# Patient Record
Sex: FEMALE | Race: WHITE | Employment: FULL TIME | URBAN - METROPOLITAN AREA
[De-identification: names, ages, dates, MRNs, and addresses within clinical notes are randomized per-mention and may not be internally consistent; named-entity substitution may affect disease eponyms.]

---

## 2018-02-24 ENCOUNTER — HOSPITAL ENCOUNTER (OUTPATIENT)
Dept: MAMMOGRAPHY | Age: 63
Discharge: HOME | End: 2018-02-26
Payer: COMMERCIAL

## 2018-02-24 DIAGNOSIS — Z12.31 VISIT FOR SCREENING MAMMOGRAM: ICD-10-CM

## 2018-02-24 PROCEDURE — 77063 BREAST TOMOSYNTHESIS BI: CPT

## 2019-03-16 ENCOUNTER — HOSPITAL ENCOUNTER (OUTPATIENT)
Dept: MAMMOGRAPHY | Age: 64
Discharge: HOME | End: 2019-03-18
Payer: COMMERCIAL

## 2019-03-16 DIAGNOSIS — Z12.39 BREAST CANCER SCREENING: ICD-10-CM

## 2019-03-16 PROCEDURE — 77063 BREAST TOMOSYNTHESIS BI: CPT

## 2020-11-12 ENCOUNTER — OFFICE VISIT (OUTPATIENT)
Dept: FAMILY MEDICINE CLINIC | Age: 65
End: 2020-11-12
Payer: COMMERCIAL

## 2020-11-12 ENCOUNTER — HOSPITAL ENCOUNTER (OUTPATIENT)
Age: 65
Setting detail: SPECIMEN
Discharge: HOME | End: 2020-11-12
Payer: COMMERCIAL

## 2020-11-12 VITALS
HEIGHT: 62 IN | BODY MASS INDEX: 27.42 KG/M2 | TEMPERATURE: 97.2 F | OXYGEN SATURATION: 98 % | HEART RATE: 77 BPM | WEIGHT: 149 LBS

## 2020-11-12 PROCEDURE — 99202 OFFICE O/P NEW SF 15 MIN: CPT | Performed by: NURSE PRACTITIONER

## 2020-11-12 RX ORDER — LOSARTAN POTASSIUM 50 MG/1
TABLET ORAL
COMMUNITY

## 2020-11-12 RX ORDER — RISEDRONATE SODIUM 35 MG/1
TABLET, FILM COATED ORAL
COMMUNITY
Start: 2020-10-14 | End: 2022-02-22 | Stop reason: SDUPTHER

## 2020-11-12 RX ORDER — HYDROCHLOROTHIAZIDE 12.5 MG/1
TABLET ORAL
COMMUNITY
Start: 2020-10-14

## 2020-11-12 RX ORDER — SIMVASTATIN 20 MG
TABLET ORAL
COMMUNITY
Start: 2020-10-14

## 2020-11-12 ASSESSMENT — PATIENT HEALTH QUESTIONNAIRE - PHQ9
2. FEELING DOWN, DEPRESSED OR HOPELESS: 0
SUM OF ALL RESPONSES TO PHQ9 QUESTIONS 1 & 2: 0
SUM OF ALL RESPONSES TO PHQ QUESTIONS 1-9: 0
1. LITTLE INTEREST OR PLEASURE IN DOING THINGS: 0
SUM OF ALL RESPONSES TO PHQ QUESTIONS 1-9: 0
SUM OF ALL RESPONSES TO PHQ QUESTIONS 1-9: 0

## 2020-11-12 ASSESSMENT — ENCOUNTER SYMPTOMS
COUGH: 0
SORE THROAT: 1
EYE DISCHARGE: 0
CHEST TIGHTNESS: 0
VOICE CHANGE: 0
SINUS PRESSURE: 0
EYE REDNESS: 0
WHEEZING: 0
SHORTNESS OF BREATH: 0

## 2020-11-12 NOTE — PROGRESS NOTES
[address] [facility]  [address] [place] [address]  Dept: [phone]  Dept Fax: [phone]     Marion Powell is a 63 y.o. female who presents to the urgent care today for her medicalconditions/complaints as noted below. Marion Cancer is c/o of Pharyngitis; Headache; and Generalized Body Aches    HPI:      Pharyngitis   This is a new problem. Episode onset: Friday. The problem has been unchanged. Associated symptoms include congestion, a fever (99), headaches and a sore throat. Pertinent negatives include no chest pain, chills, coughing, fatigue, myalgias, rash or weakness. The symptoms are aggravated by drinking, eating and swallowing. She has tried nothing for the symptoms. The treatment provided no relief. Past Medical History:   Diagnosis Date    Elevated cholesterol     Headache(784.0)     HTN (hypertension)            Current Outpatient Medications   Medication Sig Dispense Refill    losartan (COZAAR) 50 MG tablet losartan 50 mg tablet      hydroCHLOROthiazide (HYDRODIURIL) 12.5 MG tablet       risedronate (ACTONEL) 35 MG tablet       simvastatin (ZOCOR) 20 MG tablet       losartan-hydrochlorothiazide (HYZAAR) 50-12.5 MG per tablet Take 1 tablet by mouth daily.  fish oil-omega-3 fatty acids 1000 MG capsule Take 2 g by mouth daily.  Ascorbic Acid (VITAMIN C) 500 MG tablet Take 500 mg by mouth daily.  Glucosamine-Chondroitin 500-400 MG CAPS Take 2 each by mouth 2 times daily.  Omeprazole Magnesium (PRILOSEC OTC PO) Take  by mouth.  aspirin 81 MG chewable tablet Take 81 mg by mouth daily.  therapeutic multivitamin-minerals (THERAGRAN-M) tablet Take 1 tablet by mouth daily. No current facility-administered medications for this visit. No Known Allergies    Subjective:      Review of Systems   Constitutional: Positive for fever (99). Negative for chills and fatigue.    HENT:

## 2020-11-17 LAB — SARS-COV-2, NAA: NOT DETECTED

## 2021-07-14 ENCOUNTER — OFFICE VISIT (OUTPATIENT)
Dept: ORTHOPEDIC SURGERY | Age: 66
End: 2021-07-14
Payer: COMMERCIAL

## 2021-07-14 DIAGNOSIS — M25.562 ACUTE PAIN OF LEFT KNEE: Primary | ICD-10-CM

## 2021-07-14 PROCEDURE — 99203 OFFICE O/P NEW LOW 30 MIN: CPT | Performed by: ORTHOPAEDIC SURGERY

## 2021-07-14 RX ORDER — MELOXICAM 15 MG/1
15 TABLET ORAL DAILY
Qty: 30 TABLET | Refills: 3 | Status: SHIPPED | OUTPATIENT
Start: 2021-07-14 | End: 2021-11-11

## 2021-07-14 NOTE — PROGRESS NOTES
Negrito Zapata M.D.            [facility] [address] [facility],[address], [address] [facility]           Dept Phone: [phone]           Dept Fax:  [address]           Ferzo Harris          Dept Phone: [phone]           Dept Fax:  [phone]      Chief Compliant:  Chief Complaint   Patient presents with    Pain     Lt knee        History of Present Illness: This is a 61 y.o. female who presents to the clinic today for evaluation / follow up of right knee pain. Patient is a very active 72-year-old female seen for evaluation for medial left knee pain. She does not recall any specific injury. She points to the area and actually below her knee in the pes area where she has most of her tenderness but she also has a little bit in her knee as well. No prior surgeries no prior injections she is not on anti-inflammatories. Most of her pain is depends on her activity. .       Review of Systems   Constitutional: Negative for fever, chills, sweats. Eyes: Negative for changes in vision, or pain. HENT: Negative for ear ache, epistaxis, or sore throat. Respiratory/Cardio: Negative for Chest pain, palpitations, SOB, or cough. Gastrointestinal: Negative for abdominal pain, N/V/D. Genitourinary: Negative for dysuria, frequency, urgency, or hematuria. Neurological: Negative for headache, numbness, or weakness. Integumentary: Negative for rash, itching, laceration, or abrasion. Musculoskeletal: Positive for Pain (Lt knee)       Physical Exam:  Constitutional: Patient is oriented to person, place, and time. Patient appears well-developed and well nourished. HENT: Negative otherwise noted  Head: Normocephalic and Atraumatic  Nose: Normal  Eyes: Conjunctivae and EOM are normal  Neck: Normal range of motion Neck supple.     Respiratory/Cardio: Effort normal. No respiratory distress. Musculoskeletal: Examination of the patient's left knee notes no obvious effusion. She does have a some slight swelling over the pes area. Her knee motion is good however 0 to 130 degrees. Aria's is very equivocal.  No crepitus or grinding. No effusion Lachman's normal collaterals normal no hip rotational pain no IT band findings no calf tenderness negative Homans. Neurological: Patient is alert and oriented to person, place, and time. Normal strenght. No sensory deficit. Skin: Skin is warm and dry  Psychiatric: Behavior is normal. Thought content normal.  Nursing note and vitals reviewed. Labs and Imaging:   Patient had x-rays available for review from the [facility] clinic pushed into the [facility] which show standing AP and lateral of the left knee. Patient has some moderate medial joint space narrowing of her left knee with some early spur formation as well. Laterally she is okay. On the lateral view itself she has some modest patellofemoral narrowing but certainly nothing too severe     No orders of the defined types were placed in this encounter. Assessment and Plan: Moderate degenerative joint disease left knee  Pes bursitis left knee        This is a 61 y.o. female who presents to the clinic today for evaluation / follow up of DJD left knee. Past History:    Current Outpatient Medications:     losartan (COZAAR) 50 MG tablet, losartan 50 mg tablet, Disp: , Rfl:     hydroCHLOROthiazide (HYDRODIURIL) 12.5 MG tablet, , Disp: , Rfl:     risedronate (ACTONEL) 35 MG tablet, , Disp: , Rfl:     simvastatin (ZOCOR) 20 MG tablet, , Disp: , Rfl:     losartan-hydrochlorothiazide (HYZAAR) 50-12.5 MG per tablet, Take 1 tablet by mouth daily. , Disp: , Rfl:     fish oil-omega-3 fatty acids 1000 MG capsule, Take 2 g by mouth daily. , Disp: , Rfl:     Ascorbic Acid (VITAMIN C) 500 MG tablet, Take 500 mg by mouth daily.   , Disp: , Rfl:     Glucosamine-Chondroitin 500-400 MG CAPS, Take 2 each by mouth 2 times daily. , Disp: , Rfl:     Omeprazole Magnesium (PRILOSEC OTC PO), Take  by mouth.  , Disp: , Rfl:     aspirin 81 MG chewable tablet, Take 81 mg by mouth daily. , Disp: , Rfl:     therapeutic multivitamin-minerals (THERAGRAN-M) tablet, Take 1 tablet by mouth daily. , Disp: , Rfl:   No Known Allergies  Social History     Socioeconomic History    Marital status:      Spouse name: Not on file    Number of children: Not on file    Years of education: Not on file    Highest education level: Not on file   Occupational History    Not on file   Tobacco Use    Smoking status: Never Smoker    Smokeless tobacco: Never Used   Substance and Sexual Activity    Alcohol use: Yes     Comment: social    Drug use: No    Sexual activity: Yes     Partners: Male   Other Topics Concern    Not on file   Social History Narrative    Not on file     Social Determinants of Health     Financial Resource Strain:     Difficulty of Paying Living Expenses:    Food Insecurity:     Worried About Running Out of Food in the Last Year:     [address] in the Last Year:    Transportation Needs:     Lack of Transportation (Medical):      Lack of Transportation (Non-Medical):    Physical Activity:     Days of Exercise per Week:     Minutes of Exercise per Session:    Stress:     Feeling of Stress :    Social Connections:     Frequency of Communication with Friends and Family:     Frequency of Social Gatherings with Friends and Family:     Attends Moravian Services:     Active Member of Clubs or Organizations:     Attends Club or Organization Meetings:     Marital Status:    Intimate Partner Violence:     Fear of Current or Ex-Partner:     Emotionally Abused:     Physically Abused:     Sexually Abused:      Past Medical History:   Diagnosis Date    Elevated cholesterol     Headache(784.0)     HTN (hypertension)      Past Surgical History:   Procedure Laterality Date     SECTION      COLONOSCOPY      with endoscopy    TONSILLECTOMY AND ADENOIDECTOMY      UPPER GASTROINTESTINAL ENDOSCOPY       Family History   Problem Relation Age of Onset    Heart Disease Father         chf    Diabetes Mother     Hypertension Mother     Heart Disease Mother         chf    Heart Disease Maternal Grandmother     Heart Attack Maternal Grandmother     Heart Disease Maternal Grandfather    Plan  I informed the patient of the above findings. I do not feel that she has a meniscus tear at this time she does have moderate arthritis and I think she would benefit from be started on an anti-inflammatory. She will be started on Mobic 15 mg p.o. daily and this be called in for her. Patient will also was advised about topical Voltaren gel to be applied to the bursal area as well as some stretches. We will see how she does with this. She will contact us in a month if there is no resolution otherwise anticipate doing well    Provider Attestation:  Ary Angelo, personally performed the services described in this documentation. All medical record entries made by the scribe were at my direction and in my presence. I have reviewed the chart and discharge instructions and agree that the records reflect my personal performance and is accurate and complete. Octaviano Noriega MD. 21      Please note that this chart was generated using voice recognition Dragon dictation software. Although every effort was made to ensure the accuracy of this automated transcription, some errors in transcription may have occurred.

## 2021-08-04 ENCOUNTER — TELEPHONE (OUTPATIENT)
Dept: ORTHOPEDIC SURGERY | Age: 66
End: 2021-08-04

## 2021-08-04 NOTE — TELEPHONE ENCOUNTER
Patient called in requesting some advice for her knee. Patient would like any suggestions or possible exercises she could do for her knee. Please advise and address thank you!

## 2021-08-05 ENCOUNTER — OFFICE VISIT (OUTPATIENT)
Dept: ORTHOPEDIC SURGERY | Age: 66
End: 2021-08-05
Payer: COMMERCIAL

## 2021-08-05 DIAGNOSIS — M25.562 ACUTE PAIN OF LEFT KNEE: Primary | ICD-10-CM

## 2021-08-05 DIAGNOSIS — M70.50 PES ANSERINE BURSITIS: ICD-10-CM

## 2021-08-05 PROCEDURE — 20610 DRAIN/INJ JOINT/BURSA W/O US: CPT | Performed by: PHYSICIAN ASSISTANT

## 2021-08-05 PROCEDURE — 99214 OFFICE O/P EST MOD 30 MIN: CPT | Performed by: PHYSICIAN ASSISTANT

## 2021-08-05 RX ORDER — BUPIVACAINE HYDROCHLORIDE 5 MG/ML
1 INJECTION, SOLUTION PERINEURAL ONCE
Status: COMPLETED | OUTPATIENT
Start: 2021-08-05 | End: 2021-08-05

## 2021-08-05 RX ORDER — LIDOCAINE HYDROCHLORIDE 10 MG/ML
1 INJECTION, SOLUTION INFILTRATION; PERINEURAL ONCE
Status: COMPLETED | OUTPATIENT
Start: 2021-08-05 | End: 2021-08-05

## 2021-08-05 RX ORDER — BETAMETHASONE SODIUM PHOSPHATE AND BETAMETHASONE ACETATE 3; 3 MG/ML; MG/ML
6 INJECTION, SUSPENSION INTRA-ARTICULAR; INTRALESIONAL; INTRAMUSCULAR; SOFT TISSUE ONCE
Status: COMPLETED | OUTPATIENT
Start: 2021-08-05 | End: 2021-08-05

## 2021-08-05 RX ADMIN — BETAMETHASONE SODIUM PHOSPHATE AND BETAMETHASONE ACETATE 6 MG: 3; 3 INJECTION, SUSPENSION INTRA-ARTICULAR; INTRALESIONAL; INTRAMUSCULAR; SOFT TISSUE at 16:20

## 2021-08-05 RX ADMIN — LIDOCAINE HYDROCHLORIDE 1 ML: 10 INJECTION, SOLUTION INFILTRATION; PERINEURAL at 16:23

## 2021-08-05 RX ADMIN — BUPIVACAINE HYDROCHLORIDE 5 MG: 5 INJECTION, SOLUTION PERINEURAL at 16:23

## 2021-08-05 NOTE — PROGRESS NOTES
[facility], [address] [place], [address] [place] [address], [id number] [place] [address]           Dept Phone: [phone]           Dept Fax:  [phone] [address] [facility]           Feroz Harris          Dept Phone: [phone]           Dept Fax:  [phone]      Chief Compliant:  Chief Complaint   Patient presents with    Pain     left knee         History of Present Illness: This is a 61 y.o. female who presents to the clinic today for evaluation of had concerns including Pain (left knee ). Mrs. Sarai Cardona is a pleasant 24-year-old female presents for reevaluation of left knee pain. She initially saw Dr. Gerry Becker on 7/14/2021. At that time patient was found to have some mild osteoarthritis however the majority of her pain was over the pes bursa. She was started on Mobic for the arthritis and recommended to obtain over-the-counter Voltaren gel for the pes bursitis. Patient returns today noting minimal improvement of pain. She states that her pain actually has been worse for the last 1 week without any new injury or trauma. Patient reports she is very active does a lot of walking and bike riding she also is an avid golfer. She states over the last week her pain has gotten worse in severity but is in the same spot as it was 3 weeks ago. Pain continues to be most severe to the anterior medial knee below the medial joint line itself. Associated with focal swelling in this area. Of note patient denies any injury, trauma or fall prior to the onset of initial pain 3 weeks ago.        Past History:    Current Outpatient Medications:     meloxicam (MOBIC) 15 MG tablet, Take 1 tablet by mouth daily, Disp: 30 tablet, Rfl: 3    losartan (COZAAR) 50 MG tablet, losartan 50 mg tablet, Disp: , Rfl:     hydroCHLOROthiazide (HYDRODIURIL) 12.5 MG tablet, , Disp: , Rfl:    risedronate (ACTONEL) 35 MG tablet, , Disp: , Rfl:     simvastatin (ZOCOR) 20 MG tablet, , Disp: , Rfl:     losartan-hydrochlorothiazide (HYZAAR) 50-12.5 MG per tablet, Take 1 tablet by mouth daily. , Disp: , Rfl:     fish oil-omega-3 fatty acids 1000 MG capsule, Take 2 g by mouth daily. , Disp: , Rfl:     Ascorbic Acid (VITAMIN C) 500 MG tablet, Take 500 mg by mouth daily. , Disp: , Rfl:     Glucosamine-Chondroitin 500-400 MG CAPS, Take 2 each by mouth 2 times daily. , Disp: , Rfl:     Omeprazole Magnesium (PRILOSEC OTC PO), Take  by mouth.  , Disp: , Rfl:     aspirin 81 MG chewable tablet, Take 81 mg by mouth daily. , Disp: , Rfl:     therapeutic multivitamin-minerals (THERAGRAN-M) tablet, Take 1 tablet by mouth daily. , Disp: , Rfl:   No Known Allergies  Social History     Socioeconomic History    Marital status:      Spouse name: Not on file    Number of children: Not on file    Years of education: Not on file    Highest education level: Not on file   Occupational History    Not on file   Tobacco Use    Smoking status: Never Smoker    Smokeless tobacco: Never Used   Substance and Sexual Activity    Alcohol use: Yes     Comment: social    Drug use: No    Sexual activity: Yes     Partners: Male   Other Topics Concern    Not on file   Social History Narrative    Not on file     Social Determinants of Health     Financial Resource Strain:     Difficulty of Paying Living Expenses:    Food Insecurity:     Worried About Running Out of Food in the Last Year:     [address] in the Last Year:    Transportation Needs:     Lack of Transportation (Medical):      Lack of Transportation (Non-Medical):    Physical Activity:     Days of Exercise per Week:     Minutes of Exercise per Session:    Stress:     Feeling of Stress :    Social Connections:     Frequency of Communication with Friends and Family:     Frequency of Social Gatherings with Friends and Family:     Attends Yarsani Services:     Active Member of Clubs or Organizations:     Attends Club or Organization Meetings:     Marital Status:    Intimate Partner Violence:     Fear of Current or Ex-Partner:     Emotionally Abused:     Physically Abused:     Sexually Abused:      Past Medical History:   Diagnosis Date    Elevated cholesterol     Headache(784.0)     HTN (hypertension)      Past Surgical History:   Procedure Laterality Date     SECTION      COLONOSCOPY      with endoscopy    TONSILLECTOMY AND ADENOIDECTOMY      UPPER GASTROINTESTINAL ENDOSCOPY       Family History   Problem Relation Age of Onset    Heart Disease Father         chf    Diabetes Mother     Hypertension Mother     Heart Disease Mother         chf    Heart Disease Maternal Grandmother     Heart Attack Maternal Grandmother     Heart Disease Maternal Grandfather         Review of Systems   Constitutional: Negative for fever, chills, sweats. Eyes: Negative for changes in vision, or pain. HENT: Negative for ear ache, epistaxis, or sore throat. Respiratory/Cardio: Negative for Chest pain, palpitations, SOB, or cough. Gastrointestinal: Negative for abdominal pain, N/V/D. Genitourinary: Negative for dysuria, frequency, urgency, or hematuria. Neurological: Negative for headache, numbness, or weakness. Integumentary: Negative for rash, itching, laceration, or abrasion. Musculoskeletal: Positive for Pain (left knee )       Physical Exam:  Constitutional: Patient is oriented to person, place, and time. Patient appears well-developed and well nourished. HENT: Negative otherwise noted  Head: Normocephalic and Atraumatic  Nose: Normal  Eyes: Conjunctivae and EOM are normal  Neck: Normal range of motion Neck supple. Respiratory/Cardio: Effort normal. No respiratory distress.   Musculoskeletal:    Left Knee:     Skin: warm and dry, no rash or erythema  Vasculature: 2+ pedal pulses bilaterally  Neuro: Sensation grossly intact to light touch diffusely  Alignment: Normal  Tenderness: Moderate tenderness to the medial hamstring tendon and the pes bursal area. Mild medial joint line no lateral joint line tenderness medially or laterally. No tenderness to quad/patellar tendon or posterior knee. Effusion: none  Swelling: Focal swelling over the pes bursal area    ROM: (Degrees)       A P       Extension  0 0       Flexion   120 125       Crepitation  No       Muscle strength:         Flexion   5      Extension  5      SLR   5        Extensor lag   n          Special testing:  y    Pain with deep knee flexion     n    Patellar grind       y    Patellar apprehension      n    Patellar glide         n    Lachman       n    Anterior drawer      n    Pivot shift       n    Posterior drawer      n    Dial test       n    Posterolateral drawer      n    Posterior Sag       n    MCL        n    LCL          Mild    Medial joint line tenderness     n    Lateral joint line tenderness     y    McMurrey's         Neurological: Patient is alert and oriented to person, place, and time. Normal strenght. No sensory deficit. Skin: Skin is warm and dry  Psychiatric: Behavior is normal. Thought content normal.  Nursing note and vitals reviewed. Labs and Imaging:         No New x-rays are taken today however [facility] are again available for review. Moderate medial and patellofemoral joint space narrowing with early spur formation. No evidence of acute fracture. No orders of the defined types were placed in this encounter. Assessment and Plan:  1. Acute pain of left knee    2. Pes anserine bursitis          PLAN:  Jim Chan is a 61 y.o. old female who presents to the clinic today for evaluation of continued left knee pain consistent with pes anserine bursitis.   Dr. Ezra Nguyen initially started patient on Mobic and Voltaren gel unfortunately patient notes mild minimal relief of pain over the last week she does note that her pain has been slightly worse. She has focal swelling and significant tenderness to the pes anserine bursal area. She has some mild medial joint line tenderness concerning for possible medial meniscus tear however she does have some moderate arthritis in this area as well. No evidence of ligamentous laxity. 1.  Patient has failed over-the-counter and prescription strength NSAIDs at this time. 2.  Discussed treatment options available to her at this time patient elected to proceed with a Pes anserine bursal Celestone injection which is given as outlined below. Educated on home exercises to perform specifically focused on stretching and range of motion. 3.  Also considered is a medial meniscus tear as patient does have quite a bit of medial joint line tenderness with underlying osteoarthritis in that area. Should she continue to be significant painful despite this injection at next follow-up we will discuss possibility of an MRI of the left knee. 4.  Follow-up in 3 to 4 weeks however patient may call return sooner for any questions or concerns    Procedure Note: Left Pes Bursal Celestone Injection   An informed verbal consent for the procedure was obtained and risks including, but not limited to: allergy to medications, injection, bleeding, stiffness of joint, recurrence of symptoms, loss of function, swelling, drainage, irrigation, need for surgery and pseudo-septic inflammation, were explained to the patient. Also, discussed was the potential for further injections, irrigation and debridement and surgery. Alternate means of treatment have also been discussed with the patient. Following an appropriate discussion with the patient regarding the risks and benefits of the procedure she consented to proceed. her left Pes bursal area was prepped using betadine solution and alcohol swab.  Using aseptic technique and through a over the area of most severe tenderness and swelling 3 cc mixture of 1 cc of 6 lidocaine, 1 cc of Marcaine and 1 cc of 6 mg/mL Celestone injection in the area of most severe tenderness over the pes anserine bursa. A band aid was applied to the injection site. she tolerated the injection with no immediate adverse reactions. Electronically signed by Diamante Canchola on 8/5/21 at 2:07 PM EDT        Please note that this chart was generated using voice recognition Dragon dictation software. Although every effort was made to ensure the accuracy of this automated transcription, some errors in transcription may have occurred.

## 2021-08-06 NOTE — TELEPHONE ENCOUNTER
Pt saw Arie Howell on 8/5/21. LVM to return a call if she had any additional questions that were not answered during  her appointment.

## 2021-08-23 ENCOUNTER — TELEPHONE (OUTPATIENT)
Dept: OBGYN CLINIC | Age: 66
End: 2021-08-23

## 2021-08-23 NOTE — TELEPHONE ENCOUNTER
Pt called she is a new patient she has an appt in October she has a pessary and she took it out because she started experiencing some spotting and she is still having the spotting she is wondering if she should be seen sooner or wait it out

## 2021-08-23 NOTE — TELEPHONE ENCOUNTER
Is there a 7:30 on a Tuesday or Friday where you can add her sooner? If so she can be in one of those spots.   Thanks

## 2021-09-02 ENCOUNTER — OFFICE VISIT (OUTPATIENT)
Dept: ORTHOPEDIC SURGERY | Age: 66
End: 2021-09-02
Payer: COMMERCIAL

## 2021-09-02 VITALS — HEIGHT: 62 IN | BODY MASS INDEX: 27.42 KG/M2 | WEIGHT: 149 LBS | RESPIRATION RATE: 12 BRPM

## 2021-09-02 DIAGNOSIS — M70.50 PES ANSERINE BURSITIS: Primary | ICD-10-CM

## 2021-09-02 DIAGNOSIS — M17.12 PRIMARY OSTEOARTHRITIS OF LEFT KNEE: ICD-10-CM

## 2021-09-02 PROCEDURE — 99213 OFFICE O/P EST LOW 20 MIN: CPT | Performed by: PHYSICIAN ASSISTANT

## 2021-09-08 NOTE — PROGRESS NOTES
 Elevated cholesterol     Headache(784.0)     HTN (hypertension)      Past Surgical History:   Procedure Laterality Date     SECTION      COLONOSCOPY      with endoscopy    TONSILLECTOMY AND ADENOIDECTOMY      UPPER GASTROINTESTINAL ENDOSCOPY       Family History   Problem Relation Age of Onset    Heart Disease Father         chf    Diabetes Mother     Hypertension Mother     Heart Disease Mother         chf    Heart Disease Maternal Grandmother     Heart Attack Maternal Grandmother     Heart Disease Maternal Grandfather         Review of Systems   Constitutional: Negative for fever, chills, sweats. Eyes: Negative for changes in vision, or pain. HENT: Negative for ear ache, epistaxis, or sore throat. Respiratory/Cardio: Negative for Chest pain, palpitations, SOB, or cough. Gastrointestinal: Negative for abdominal pain, N/V/D. Genitourinary: Negative for dysuria, frequency, urgency, or hematuria. Neurological: Negative for headache, numbness, or weakness. Integumentary: Negative for rash, itching, laceration, or abrasion. Musculoskeletal: Positive for Knee Pain (left, pes bursa injection- 21)       Physical Exam:  Constitutional: Patient is oriented to person, place, and time. Patient appears well-developed and well nourished. HENT: Negative otherwise noted  Head: Normocephalic and Atraumatic  Nose: Normal  Eyes: Conjunctivae and EOM are normal  Neck: Normal range of motion Neck supple. Respiratory/Cardio: Effort normal. No respiratory distress. Musculoskeletal:    left Knee:     Skin: warm and dry, no rash or erythema  Vasculature: 2+ pedal pulses bilaterally  Neuro: Sensation grossly intact to light touch diffusely  Alignment: Normal  Tenderness: Mild tenderness the medial joint line. Mild tenderness to the pes anserine bursa. No tenderness to lateral joint line. No tenderness to quad/patellar tendon, or posterior knee.   Effusion: small    ROM: (Degrees)       A P       Extension  0 0       Flexion   115 120       Crepitation  Yes       Muscle strength:         Flexion   5      Extension  5      SLR   5        Extensor lag   y          Special testing:  y    Pain with deep knee flexion     y    Patellar grind       n    Patellar apprehension      n    Patellar glide         n    Lachman       n    Anterior drawer      n    Pivot shift       n    Posterior drawer      n    Dial test       n    Posterolateral drawer      n    Posterior Sag       n    MCL        n    LCL          mild    Medial joint line tenderness     n    Lateral joint line tenderness     y    Appley's         Neurological: Patient is alert and oriented to person, place, and time. Normal strenght. No sensory deficit. Skin: Skin is warm and dry  Psychiatric: Behavior is normal. Thought content normal.  Nursing note and vitals reviewed. Labs and Imaging:        No orders of the defined types were placed in this encounter. Assessment and Plan:  1. Pes anserine bursitis    2. Primary osteoarthritis of left knee          PLAN:  Corina Burgos is a 61 y.o. old female who presents for follow up left knee pain. Patient with history of some mild osteoarthritis within this left knee she was found to have pes anserine bursitis when she was seen on 8/5/2021 she underwent a Pez anserine bursal injection at that time. Also considered is possible medial meniscus tear given her pain   1. Patient reports that this injection did provide significant relief of pain. She does occasionally have pain if she is overactive or kneels directly on her neck but does feel better than she did 1 month ago. 2.  On examination patient continues to be tender over the pes anserine bursa and the medial joint line.   She is educated that I do question possible medial meniscus tear and should this pain continue she may benefit from a possible MRI however patient reports she is doing much better and will continue to proceed with conservative management at this time. 3.  We will see patient back on a as needed basis however she may call return for any questions or concerns        Electronically signed by ANTONIETTA Guzman on 9/8/21 at 8:26 AM EDT        Please note that this chart was generated using voice recognition Dragon dictation software. Although every effort was made to ensure the accuracy of this automated transcription, some errors in transcription may have occurred.

## 2021-12-22 ENCOUNTER — OFFICE VISIT (OUTPATIENT)
Dept: OBGYN CLINIC | Age: 66
End: 2021-12-22
Payer: COMMERCIAL

## 2021-12-22 VITALS
HEIGHT: 62 IN | DIASTOLIC BLOOD PRESSURE: 88 MMHG | WEIGHT: 154.2 LBS | SYSTOLIC BLOOD PRESSURE: 129 MMHG | BODY MASS INDEX: 28.37 KG/M2 | HEART RATE: 76 BPM

## 2021-12-22 DIAGNOSIS — N39.3 STRESS INCONTINENCE: ICD-10-CM

## 2021-12-22 DIAGNOSIS — Z12.31 SCREENING MAMMOGRAM FOR BREAST CANCER: ICD-10-CM

## 2021-12-22 DIAGNOSIS — Z01.419 WOMEN'S ANNUAL ROUTINE GYNECOLOGICAL EXAMINATION: Primary | ICD-10-CM

## 2021-12-22 DIAGNOSIS — N81.10 PROLAPSE OF ANTERIOR VAGINAL WALL: ICD-10-CM

## 2021-12-22 PROCEDURE — 99386 PREV VISIT NEW AGE 40-64: CPT | Performed by: OBSTETRICS & GYNECOLOGY

## 2021-12-22 RX ORDER — OXYQUINOLINE/BORIC ACID 0.025 %
1 JELLY WITH APPLICATOR (GRAM) VAGINAL PRN
Qty: 113.4 G | Refills: 0 | Status: SHIPPED | OUTPATIENT
Start: 2021-12-22

## 2021-12-22 ASSESSMENT — ENCOUNTER SYMPTOMS
SHORTNESS OF BREATH: 0
COUGH: 0
BACK PAIN: 0
ABDOMINAL PAIN: 0

## 2021-12-22 NOTE — PROGRESS NOTES
[facility] PHYSICIANS  [facility] - [address] [facility] [address] [facility]  Dept: [phone]    Chief complaint:   Chief Complaint   Patient presents with    Annual Exam       History Present Illness: Denisa Vela is a 62 yo female who presents for her annual exam, and to establish care. She did receive her COVID vaccine. She did try a pessary for awhile and only uses it occasionally now because of discharge and some slight irritation. She is having incontinence with laugh/cough/sneeze sometimes and some urgency as well. She denies any discharge when she isn't using the pessary. She is sexually active with her  and denies any dyspareunia and says her prolapse doesn't interfere. She says that sometimes she does have a bulge outside the vagina. She isn't sure about the pessary. She denies any bowel or bladder issues. Current Medications (OTC/Herbal):   Current Outpatient Medications   Medication Sig Dispense Refill    meloxicam (MOBIC) 15 MG tablet TAKE ONE TABLET BY MOUTH DAILY 90 tablet 3    losartan (COZAAR) 50 MG tablet losartan 50 mg tablet      hydroCHLOROthiazide (HYDRODIURIL) 12.5 MG tablet       risedronate (ACTONEL) 35 MG tablet       simvastatin (ZOCOR) 20 MG tablet       losartan-hydrochlorothiazide (HYZAAR) 50-12.5 MG per tablet Take 1 tablet by mouth daily.  Glucosamine-Chondroitin 500-400 MG CAPS Take 2 each by mouth 2 times daily.  Omeprazole Magnesium (PRILOSEC OTC PO) Take  by mouth.  therapeutic multivitamin-minerals (THERAGRAN-M) tablet Take 1 tablet by mouth daily.  fish oil-omega-3 fatty acids 1000 MG capsule Take 2 g by mouth daily. (Patient not taking: Reported on 12/22/2021)      Ascorbic Acid (VITAMIN C) 500 MG tablet Take 500 mg by mouth daily. (Patient not taking: Reported on 12/22/2021)      aspirin 81 MG chewable tablet Take 81 mg by mouth daily.    (Patient not taking: Reported on 12/22/2021)       No current facility-administered medications for this visit. Allergies: No Known Allergies  Past Medical History:   Past Medical History:   Diagnosis Date    Elevated cholesterol     Headache(784.0)     HTN (hypertension)      Past Surgical History:   Past Surgical History:   Procedure Laterality Date     SECTION      COLONOSCOPY      with endoscopy    TONSILLECTOMY AND ADENOIDECTOMY      UPPER GASTROINTESTINAL ENDOSCOPY       Obstetric History:   3  Para 1  Gynecologic History: LMP postmenopausal   Menarche 14    Last Pap: 20       Any history of abnormal paps no    PriorColpo/Biopsy n/a     Last Mammogram 3/16/19  Result normal  Contraception: postmenopausal  Complications: none  STDs: none  Psychosocial History: Occupation:   teacher   Caffeine No    At risk for depression No    Abuse:   No  Seatbelt:   Yes  Exercise:  Yes    Social History     Socioeconomic History    Marital status:      Spouse name: Not on file    Number of children: Not on file    Years of education: Not on file    Highest education level: Not on file   Occupational History    Not on file   Tobacco Use    Smoking status: Never Smoker    Smokeless tobacco: Never Used   Vaping Use    Vaping Use: Never used   Substance and Sexual Activity    Alcohol use: Yes     Comment: social    Drug use: No    Sexual activity: Yes     Partners: Male   Other Topics Concern    Not on file   Social History Narrative    Not on file     Social Determinants of Health     Financial Resource Strain:     Difficulty of Paying Living Expenses: Not on file   Food Insecurity:     Worried About Running Out of Food in the Last Year: Not on file    Lali of Food in the Last Year: Not on file   Transportation Needs:     Lack of Transportation (Medical): Not on file    Lack of Transportation (Non-Medical):  Not on file   Physical Activity:     Days of Exercise per Week: Not on file    Minutes of Exercise per Session: Not on file   Stress:     Feeling of Stress : Not on file   Social Connections:     Frequency of Communication with Friends and Family: Not on file    Frequency of Social Gatherings with Friends and Family: Not on file    Attends [employer] Services: Not on file    Active Member of [address] [place] [address] [employer] or Organizations: Not on file    Attends Club or Organization Meetings: Not on file    Marital Status: Not on file   Intimate Partner Violence:     Fear of Current or Ex-Partner: Not on file    Emotionally Abused: Not on file    Physically Abused: Not on file    Sexually Abused: Not on file   Housing Stability:     Unable to Pay for Housing in the Last Year: Not on file    Number of [place] in the Last Year: Not on file    Unstable Housing in the Last Year: Not on file       Family History   Problem Relation Age of Onset    Heart Disease Father         chf    Diabetes Mother     Hypertension Mother     Heart Disease Mother         chf    Heart Disease Maternal Grandmother     Heart Attack Maternal Grandmother     Heart Disease Maternal Grandfather        Review of Systems:   Review of Systems   Constitutional: Negative for chills and fever. HENT: Negative for congestion. Respiratory: Negative for cough and shortness of breath. Cardiovascular: Negative for chest pain and palpitations. Gastrointestinal: Negative for abdominal pain. Genitourinary: Negative for dyspareunia, pelvic pain and vaginal discharge. Musculoskeletal: Negative for back pain. Neurological: Negative for dizziness and light-headedness. Psychiatric/Behavioral: The patient is not nervous/anxious. Physical exam:  vitals:  Height   5  ft    2 in,  Weight    154 lbs,   129/88 BP  Gen: alert, no apparent distress  HEENT:No pathologic skin lesions noted,NC/AT,PERRL, normal midline nontender thyroid   Lung Exam: Clear to auscultation in all fields bilaterally, without wheezes,rales or rhonchi.   Cardiac Exam: Normal sinus rhythm andrate, without murmurs, rubs or gallops appreciated. Breast Exam: Symmetric without pathological skin changes, nontender without discrete suspicious masses palpated, supraclavicular or axillary adenopathy or nipple discharge noted. Abdominal Exam: Nontender to deep palpation without organomegaly, masses or CVAT appreciated, BS positive. No spinal deformation or tenderness. External Genitalia: Normal development without vulvar,vaginal or cervical lesions noted. Stage 2 anterior vaginal prolapse. Normal vaginal discharge, uterus anterior, 4-6 weeks without CMT. Adnexa nontender without abnormal masses bilaterally. Rectal Exam: Omitted. Extremities: Nontender without clubbing, cyanosis or edema. F.R.O.M. Neurologic Exam: Grossly intact without noted sensorimotor deficits and oriented x 3. Assessment/Plan:   Unremarkable annual Gyn exam.    Cervical Cytology Evaluation begins at 24years old. If Negative Cytology, Follow-up screening per current guidelines. Mammograms every 1year. If 35 yo and last mammogram was negative. Hereditary Breast, Ovarian, Colon and Uterine Cancer screening done. Anterior prolapse - has pessary, PT referral placed for incontinence as well  Calcium and Vitamin D dosing reviewed. Colonoscopy screening reviewed as well as onset for bone density testing. Birth control and barrier recommendations discussed. STD counseling and prevention reviewed. Routine health maintenance per patients PCP.   Pt to follow up for annual exam in 1 year    Qiana Presley MD  [address]

## 2022-01-03 DIAGNOSIS — Z01.419 WOMEN'S ANNUAL ROUTINE GYNECOLOGICAL EXAMINATION: ICD-10-CM

## 2022-01-13 ENCOUNTER — TELEPHONE (OUTPATIENT)
Dept: OBGYN CLINIC | Age: 67
End: 2022-01-13

## 2022-01-13 DIAGNOSIS — Z01.419 WOMEN'S ANNUAL ROUTINE GYNECOLOGICAL EXAMINATION: Primary | ICD-10-CM

## 2022-01-13 NOTE — TELEPHONE ENCOUNTER
Pt called she used to be on medication because of her bone density she is wondering if she needs to continue it because she will need a refill or if she needs to do another dexa scan she said it has been 4 years since her last one

## 2022-01-31 ENCOUNTER — HOSPITAL ENCOUNTER (OUTPATIENT)
Dept: PHYSICAL THERAPY | Facility: CLINIC | Age: 67
Setting detail: THERAPIES SERIES
Discharge: HOME | End: 2022-01-31
Payer: COMMERCIAL

## 2022-01-31 PROCEDURE — 97110 THERAPEUTIC EXERCISES: CPT

## 2022-01-31 PROCEDURE — 97530 THERAPEUTIC ACTIVITIES: CPT

## 2022-01-31 PROCEDURE — 97161 PT EVAL LOW COMPLEX 20 MIN: CPT

## 2022-01-31 NOTE — PLAN OF CARE
[] [facility]) - [facility] &  Therapy  [address]  [phone]  F: [phone] [x] [address]  [facility]   [address]  P: [phone]  F: [phone] [] [address] &  Therapy  [address] [facility] [address]  P: [phone]  F: [phone] [] [address]  P: [phone]  F: [phone] [] [address]  [address] [facility] [address]   [address]   [facility]: [phone]  F: [phone]        Physical Therapy Plan of Care    Date:  2022  Patient: Merlyn Lira  : 1955  MRN: [id number]  Physician: [address]: Dany Seymour 150- visits based on med [facility]  Medical Diagnosis:   N81.10 (ICD-10-CM) - Prolapse of anterior vaginal wall   N39.3 (ICD-10-CM) - Stress incontinence   Rehab Codes: R27.8, R29.3, N39.3, N39.41, M62.81, N39.46  Onset Date: 21             Next 's appt. : 2/15/22 imaging     Subjective:   CC/HPI:Pt is a 74year old female who presents with complaints of DOMINICK & urge UI. She currently does not wear pads for leakage episodes, but does have extra pair of underwear with her at all times. Pt currently has a custom pessary for bladder prolapse from OB, but has noticed that she hasn't seen much improvement. She also had experienced abnormal discharge with it and it makes her apprehensive to continue use. Pt has had 3 babies (43, 45 & 35 yo)- 1  & 2 vaginal deliveries, requiring vacuum assist and forceps for last child. Pt believes her issues have been caused due to h/o 2 vaginal episiotomies  & \"having big babies. \" Pt states she does notice incontinence with coughing/laughing/sneezing/exercising (elliptical, youtube total body work outs/fast walking, gym goer,circuit training); has to [employer]" in order to attempt to stop urine leakage. Pt reports h/o endometriosis as teen - painful periods & medication to manage. She does note prolapse affects BMs, and tends to be more constipated, but goes daily, every other day. Has had some bowel incontinence, though infrequently. Pt denies hx of pelvic pain, UTIs, hemorrhoids & yeast infections. She is finding difficulty managing UI & decided to come to PF rehab to find conservative management of bladder prolapse other than pessary. Pt is a  at [employer].      PMHx: []??? Unremarkable []? ?? Diabetes [x]? ?? HTN  []??? Pacemaker  []? ?? MI/Heart Problems []??? Cancer []? ?? Arthritis []??? Asthma   [x]? ?? refer to full medical chart In EPIC  [x]? ?? Other: GERD    Assessment:Pt noted with weak Pelvic Floor musculature and inability to use correct musculature for strength improvement. Additionally, patient displays generalized hip/core weakness, poor posture, and has presence of grade 3 cystocele, which likely contributes to her current symptoms. Pt would benefit from physical therapy services in order to strength PF, core, and hips and to improve postural awareness/body mechanics. Will provide home exercise program, biofeedback and education as appropriate. May need home e-stim if exercise alone is not enough. Problems:   [x] ? ROM: limited PFM excursion, hypertonicity at perineum  [x] ? Strength: core, PF, and B hip/gluteal weakness   [x] ? Function: ROS: 54.17% impairment, 13 points; PPI 5 points  [x] Other: poor (lordotic) posture, stress/urge incontinence, reduced B SLS ability, B pes planus;  QL compensation,       STG: (to be met in 7 treatments)  1. Able to isolate PF musculature  2. ? Strength: PF endurance 6 seconds or greater    3. ? Function:no reports of leaking with exercise, cough, sneeze and laugh  4. Independent with Home Exercise Programs  5. Pt to demo ability to hold B SLR without compensation to indicate improved core stability  6.  Pt will demo ability to expand PF during inhale of diaphragmatic breathing for improved PF relaxation/ROM    LTG: (to be met in 14 treatments)  1. Pt to achieve PF strength of 4/5 & endurance for >8 seconds for optimal PF function   2. Able to perform all advanced ADL's without leaking  3. Bilateral hips/gluteals/core strength to 4+/5 or greater for improve hip stability during activity   4. Pt will report 2-3 point improvement on ROS to indicate improved urogenital functioning    5. Pt to maintain B SLS for 30sec or greater without significant compensations to reduce load transfer and improve SL stability & endurance. Patient goals: prevent further weakness      Rehab Potential: [x] Good [] Fair [] Poor         Treatment Plan:  [x] Therapeutic Exercise   39811  [] Iontophoresis: 4 mg/mL Dexamethasone Sodium Phosphate  mAmin  18994   [x] Therapeutic Activity  63953 [] Vasopneumatic cold with compression  55391    [] Gait Training   72624 [] Ultrasound   64200   [x] Neuromuscular Re-education  74618 [x] Electrical Stimulation Unattended  16225   [x] Manual Therapy  69087 [x] Electrical Stimulation Attended  06823   [x] Instruction in HEP  [] Lumbar/Cervical Traction  40288   [] Aquatic Therapy   78824 [x] Cold/hotpack    [] Massage   19514      [] Dry Needling, 1 or 2 muscles  80882   [x] Biofeedback, first 15 minutes   14845  [x] Biofeedback, additional 15 minutes   61504 [] Dry Needling, 3 or more muscles  85812     []  Medication allergies reviewed for use of    Dexamethasone Sodium Phosphate 4mg/ml     with iontophoresis treatments. Pt is not allergic. Frequency: 1-2x/week for 14 visits        Electronically signed by: Letitia Shaffer PT        Physician Signature:________________________________Date:__________________  By signing above or cosigning this note, I have reviewed this plan of care and certify a need for medically necessary rehabilitation services.      *PLEASE SIGN ABOVE AND FAX BACK ALL PAGES*

## 2022-01-31 NOTE — CONSULTS
[] [facility]) - [facility] &  Therapy  [address]  [phone]  F: [phone] [x] [address]  [facility]   [address]  P: [phone]  F: [phone] [] [address] &  Therapy  [address]  P: [phone]  F: [phone] [] [address]  P: [phone]  F: [phone] [] [address]  [address] [address] [facility] [address]   [address]   [place]: [phone]  F: [phone]      Physical Therapy Pelvic Floor Evaluation    Date: 22   Patient: Angeles Wharton      : 1955 MRN: [id number]  Physician: Pat[address]: [place]- visits based on med nec  Medical Diagnosis:   N81.10 (ICD-10-CM) - Prolapse of anterior vaginal wall   N39.3 (ICD-10-CM) - Stress incontinence   Rehab Codes: R27.8, R29.3, N39.3, N39.41, M62.81, N39.46  Onset Date: 21  Next 's appt. : 2/15/22 imaging    Subjective:   CC/HPI:Pt is a 74year old female who presents with complaints of DOMINICK & urge UI. She currently does not wear pads for leakage episodes, but does have extra pair of underwear with her at all times. Pt currently has a custom pessary for bladder prolapse from OB, but has noticed that she hasn't seen much improvement. She also had experienced abnormal discharge with it and it makes her apprehensive to continue use. Pt has had 3 babies (43, 45 & 35 yo)- 1  & 2 vaginal deliveries, requiring vacuum assist and forceps for last child. Pt believes her issues have been caused due to h/o 2 vaginal episiotomies  & \"having big babies. \" Pt states she does notice incontinence with coughing/laughing/sneezing/exercising (elliptical, youtube total body work outs/fast walking, gym goer,circuit training); has to [employer]" in order to attempt to stop urine leakage. Pt reports h/o endometriosis as teen - painful periods & medication to manage. She does note prolapse affects BMs, and tends to be more constipated, but goes daily, every other day. Has had some bowel incontinence, though infrequently. Pt denies hx of pelvic pain, UTIs, hemorrhoids & yeast infections. She is finding difficulty managing UI & decided to come to PF rehab to find conservative management of bladder prolapse other than pessary. Pt is a  at [employer]. PMHx: [] Unremarkable [] Diabetes [x] HTN  [] Pacemaker  [] MI/Heart Problems [] Cancer [] Arthritis [] Asthma   [x] refer to full medical chart In EPIC  [x] Other: GERD     Date of last pap smear and/or vaginal exam (if female): 1 month ago - normal      Tests: [x] none recent       Medications: [x] Refer to full medical record [] None [] Other:     Allergies: [x] Refer to full medical record [] None [] Other:      Function: Hand Dominance [x] Right [] Left     Working: [x] Normal Duty      Job/ADL Description:     Pain: [] Yes [x] No Location:   Pain Rating: (0-10 scale)0 /10  Pain altered Tx: [] Yes [x] No Action:     Surgical               History of abdominal surgeries? [x] Yes  [] No [] Other                          If yes, please list:               History of orthopedic surgeries?  [] Yes  [x] No [] Other                          If yes, please list:      Gynecological              # of pregnancies/birth (G/P): 3              Currently breastfeeding: [] Yes  [x] No [] Other              Type of birth: vaginal,               Pelvic floor tear/episotomy (present or absent and grade): episotomy - 2x              If vaginal, how long did you have to push: vacuum assist with birth last              Any trauma or difficulty with delivery: [x] Yes  [] No [] Other                          Urinary              General urinary symptoms: DOMINICK/ urge UI              Frequency of urination (# of times per waking hours): 4-5              Nighttime Urination (# of times per night): 1              Urge Control ( how long can you hold urine once urge strikes): depends               Pain with urination: [] Yes  [x] No              Episodes of leakage (# Per day, per week, etc): daily              Activity during leakage: coughing, laughing, sneezing, exercising              History of UTIs (more than 4 in a year):[] Yes  [x] No [] Other              History of yeast infections (more than 4 year): [] Yes  [x] No [] Other     Bowel              General bowel symptoms: [x] Constipation [] Leakage of stool Type:  [] Leakage of gas [x] none               Frequency of stool (# of bowel movements per  day, week, etc): daily, every other day              Urge Control (how long can stool be delayed or held): able to but has had some incontinent moments              Pain with bowel movement: [] Yes  [x] No [] Other              Abdominal pain related to bowel [] Yes  [x] No [] Other          Sexual function/Pelvic pain:               Relationship status: [x]    [] Single  [] Long term partner  [] Multiple partners  [] Other              Type of partner: [] Female   [x] Male  [] Other                          *Partner name (if relevant):   Sexually active currently?: [x] Yes  [] No [] Other  If yes,    [x] Vaginal  [] Rectal [] Other                 Pain with penetration: [x] Yes  [] No [] Other                          If yes,                                        [] unable to insert tampon  [] pain with tampon insertion [] unable to tolerate pelvic exam with finger  [] unable to tolerate pelvic exam with speculum [] Other:                                                    Pain with intercourse: [] Yes  [x] No [] Other                                      If yes,                                                   [] Pain with initial penetration  [] Pain with deep penetration [] other Able to achieve orgasm: [x] Yes  [] No [] Other                                      If yes,                                                   [x] Internal   [x] External  [] Other  Musculoskeletal screen:               Any issues with [x] Low back  [] Thoracic  [] Cervical  [] Hip [] Pelvis [] Other      Health/behavior:              Fiber Intake (g): increased throughout day              Water intake (oz): trying to drink more              Physically active: [x] Yes  [] No [] Other                          If yes, what current activities do you engage in? exercising              Dietary Restrictions: N/A              Bladder irritants, etc: YES, coffee, alcohol (wine)     Psychosocial: denies h/o trauma     Symptoms: [] Improving [x] Worsening [] Same      Sleep: [x] OK [] Disturbed       Objective:     Consent: Patient verbally consented to both vaginal Internal/external manual work with no red flags present 01/31/22. Patient was appropriately draped and only areas that were being treated were exposed. Therapist provided detailed explanation of treatment prior to initiation of session. Patient verbalized and demonstrated understanding and provided verbal consent. Consent was checked and received prior to initiating different treatment techniques and checked frequently throughout session. Objective:  Chaperone for Intimate Exam  · Chaperone was offered as part of the rooming process. Patient declined and agrees to continue with exam without a chaperone. Postural Assessment: increased lumbar lordosis, forward head, B upper & B lower crossed syndromes; B pes planus      Pelvic Alignment: elevated L IC, L anterior innominate rotation       Diastasis Rectus Abdominis (DONAVAN): [x] Present [] Absent                If present (finger-width and depth measured): At 7 cm above umbilicus: At 4 cm above umbilicus:                            At umbilicus:  See below At 4 cm below umbilicus: At 7 cm below umbilicus                  Bulge present: [] Present [x] Absent       Abdominal wall assessment:         Scar present [] Present [x] Absent                            If yes, restriction present?  [] Present [] Absent                                        Quadrant (s) [] right upper quadrant [] left upper quadrant  [] right lower quadrant [] left lower quadrant       Thoracolumbar mobility screen: limited by 25% with B rotation    Low back: Lumbar mobility screen: full, pain free        STRENGTH     Left Right   Hip Flex 4- 4-   Ext 3+ 3+   ER 4- 4-   IR 4- 4-   ABD 4- 4-   ADD 4- 4-   *indicates pain production      Hip/Sacroiliac Joint: cleared, overall inc tension and B tightness ( worse at L hip ), negative special hip tests  Min TA activation with verbal and tactile cueing   SLR B QL compensation (L worse than R)  SLS SLS R 11sec L 25sec ( L t-sign progressively noted )  + increased tension/tightness at B gluteals (L worse) & B hip flexors     OBSERVATION  No Deficit  Deficit  Not Tested  Comments    External                Posture    x   Anterior tilt    Pelvic alignment     x  L IC elevated and anterior rotated     Muscle Spasm     x   Perineal tightness     Scarring    x    mobility limited - h/o 2 episitomties   Diastasis     x   1 fingers at umbilicus with 1/2 knuckle depth   Introitus    x    visible cystocele    Perineal Descent    x   Tightness   Skin Condition    x    mild erythema & atrophy    Internal            Prolapse Test (POP)   x   Grade 3 cystocele   Muscle bulk     x  B pirformis   Quality of Contraction    x   Slow rise, poor hold    Sensation     x           Internal PERFECT scale:   Power (MMT) Endurance (up to 10\" MVC before 50% reduction) Repetitions (up to 10 sets of 10\" holds w/o 50% drop) Fast Twitch   (#of 1\" contractions in 10\") Elevation (lifting of post vaginal wall toward pubis) Co- contraction  (w/ TrA) Timing (cough test)   2/5 4seconds 3 reps 8reps A A A       FUNCTION  Normal  Difficult  Unable    Cough/Sneeze    x     Supine-Sit    x     Squatting    x     Bending/ stooping    x     Stairs     x     Hopping     x     Jumping     x     Running     x     Lifting/carrying     x        Functional testing: ORS: 54.17% impairment, 13 points; PPI 5 points      Assessment:Pt noted with weak Pelvic Floor musculature and inability to use correct musculature for strength improvement. Additionally, patient displays generalized hip/core weakness, poor posture, and has presence of grade 3 cystocele, which likely contributes to her current symptoms. Pt would benefit from physical therapy services in order to strength PF, core, and hips and to improve postural awareness/body mechanics. Will provide home exercise program, biofeedback and education as appropriate. May need home e-stim if exercise alone is not enough. Problems:   [x] ? ROM: limited PFM excursion, hypertonicity at perineum  [x] ? Strength: core, PF, and B hip/gluteal weakness   [x] ? Function: ROS: 54.17% impairment, 13 points; PPI 5 points  [x] Other: poor (lordotic) posture, stress/urge incontinence, reduced B SLS ability, B pes planus;  QL compensation,       STG: (to be met in 7 treatments)  1. Able to isolate PF musculature  2. ? Strength: PF endurance 6 seconds or greater    3. ? Function:no reports of leaking with exercise, cough, sneeze and laugh  4. Independent with Home Exercise Programs  5. Pt to demo ability to hold B SLR without compensation to indicate improved core stability  6. Pt will demo ability to expand PF during inhale of diaphragmatic breathing for improved PF relaxation/ROM    LTG: (to be met in 14 treatments)  1. Pt to achieve PF strength of 4/5 & endurance for >8 seconds for optimal PF function   2. Able to perform all advanced ADL's without leaking  3.  Bilateral hips/gluteals/core strength to 4+/5 or greater for improve hip stability during activity   4. Pt will report 2-3 point improvement on ROS to indicate improved urogenital functioning    5. Pt to maintain B SLS for 30sec or greater without significant compensations to reduce load transfer and improve SL stability & endurance. Patient goals: prevent further weakness      Rehab Potential: [x] Good [] Fair [] Poor   Suggested Professional Referral: [x] No [] Yes:  Barriers to Goal Achievement[de-identified] [x] No [] Yes:  Domestic Concerns: [x] No [] Yes:      Pt. Education: [x] Plans/Goals, Risks/Benefits discussed [x] Home exercise program  Method of Education: [x] Verbal [x] Demo [x] Written (see under chart log below for education specifics)  Comprehension of Education:  [x] Verbalizes understanding. [x] Demonstrates understanding. [] Needs Review. [] Demonstrates/verbalizes understanding of HEP/Ed previously given. Treatment Plan:  [x] Therapeutic Exercise   19217  [] Iontophoresis: 4 mg/mL Dexamethasone Sodium Phosphate  mAmin  99721   [x] Therapeutic Activity  74001 [] Vasopneumatic cold with compression  61688    [] Gait Training   12453 [] Ultrasound   15854   [x] Neuromuscular Re-education  09252 [x] Electrical Stimulation Unattended  89973   [x] Manual Therapy  32836 [x] Electrical Stimulation Attended  41665   [x] Instruction in HEP  [] Lumbar/Cervical Traction  38570   [] Aquatic Therapy   30713 [x] Cold/hotpack    [] Massage   31000      [] Dry Needling, 1 or 2 muscles  13445   [x] Biofeedback, first 15 minutes   70933  [x] Biofeedback, additional 15 minutes   63869 [] Dry Needling, 3 or more muscles  13865     []  Medication allergies reviewed for use of    Dexamethasone Sodium Phosphate 4mg/ml     with iontophoresis treatments. Pt is not allergic.     Frequency: 1-2x/week for 14 visits      Todays Treatment:  Modalities:   Precautions:  Exercises: [address] Access Code: [email or web address]  Exercise Reps/ Time Weight/ Level Comments   Pelvic model explanation x         Urge suppression x    FOCUS ON RELAXATION- filling up the entire abdomen + PF cavity allowing PF to natural bulge       goal is to be at 8 sec count with stream  transitions when urges arises - move to standing, firm pressure etc  minimize just in case peeing  kegel with effort, transitions  keep ribs over pelvis  heel raises or toe curls with lower urges  5 quick flicks with lower urgency     sit<>stand   tighten PFM prior to standing then relax once in stranding  tighten prior to sitting and then relax once sitting     exhale with effort and lifting pelvic floor contraction  elevate hips during kegels                            1 min guided meditation HEP       Diaphragmatic breathing with PF lengthening HEP       Happy Baby Stretch 1min     TrA palpation & bracing  HEP   With approximation of DONAVAN   TrA + bent knee fall out HEP       Piriformis stretch 1min ea   Also given in sitting as alternative for HEP   [employer]       Endurance holds 10x4\"   Elevate hips in supine         Quadruped      Thread needle next                 Prone      Quad stretch 1min     Other: DOMINICK, urge suppression & further info (see above), bladder taught knack before effort, lumbar lordosis, sleep positions, lifting, posture, and household activities, Tra contraction/palpation, HEP review, IAP regulation, heat, position, mirror for biofeedback         Specific Instructions for next treatment: give bladder diary, assess baseline Biofeedback, may suggest estrogen cream/ different pessary if conservative management fails       Evaluation Complexity:  History (Personal factors, comorbidities) [] 0 [x] 1-2 [] 3+   Exam (limitations, restrictions) [] 1-2 [] 3 [x] 4+   Clinical presentation (progression) [x] Stable [] Evolving  [] Unstable   Decision Making [x] Low [] Moderate [] High     [x] Low Complexity [] Moderate Complexity [] High Complexity      Treatment Charges: Mins Units   [x] Evaluation (low complex) 30 1   [] Modalities         [x] Ther Exercise 15 1   [] Manual Therapy     [x] Ther Activities 23 2   [] Aquatics         [] Vasocompression         [] Other             TOTAL TREATMENT TIME: 68min    Time in: 520p Time out: 630p      Electronically signed by: Karol Kennedy PT, DPT     Physician Signature:________________________________Date:__________________  By signing above or cosigning this note, I have reviewed this plan of care and certify a need for medically necessary rehabilitation services.        *PLEASE SIGN ABOVE AND FAX BACK ALL PAGES*

## 2022-02-03 ENCOUNTER — APPOINTMENT (OUTPATIENT)
Dept: PHYSICAL THERAPY | Facility: CLINIC | Age: 67
End: 2022-02-03
Payer: COMMERCIAL

## 2022-02-10 ENCOUNTER — HOSPITAL ENCOUNTER (OUTPATIENT)
Dept: PHYSICAL THERAPY | Facility: CLINIC | Age: 67
Setting detail: THERAPIES SERIES
Discharge: HOME | End: 2022-02-10
Payer: COMMERCIAL

## 2022-02-10 PROCEDURE — 97530 THERAPEUTIC ACTIVITIES: CPT

## 2022-02-10 PROCEDURE — 97112 NEUROMUSCULAR REEDUCATION: CPT

## 2022-02-10 PROCEDURE — 97110 THERAPEUTIC EXERCISES: CPT

## 2022-02-10 NOTE — FLOWSHEET NOTE
and demonstrated understanding and provided verbal consent. Consent was checked and received prior to initiating different treatment techniques and checked frequently throughout session. & denies need for chaperone.   Exercises: [employer] Access Code: [email or web address]  DB= diaphragmatic breathing  PB = pelvic brace (DB + kegel)  Exercise Reps/ Time Weight/ Level Comments   Pelvic model explanation x    For hook and pull stretch prior to endurance holds   Urge suppression  x    FOCUS ON RELAXATION- filling up the entire abdomen + PF cavity allowing PF to natural bulge       goal is to be at 8 sec count with stream  transitions when urges arises - move to standing, firm pressure etc  minimize just in case peeing  kegel with effort, transitions  keep ribs over pelvis  heel raises or toe curls with lower urges  5 quick flicks with lower urgency     sit<>stand   tighten PFM prior to standing then relax once in stranding  tighten prior to sitting and then relax once sitting     exhale with effort and lifting pelvic floor contraction  elevate hips during kegels                            1 min guided meditation HEP       Diaphragmatic breathing with PF lengthening 1min   Led by PT   LTR with core emphasis 10x Red swiss Added 2/10   DKTC with core emphasis 10x Red swiss Added 2/10   Bridges on ball  15x Red swiss Added 2/10   Happy Baby Stretch 1min       TrA palpation & bracing  HEP   With approximation of DONAVAN   TrA + double bent knee fall out 15x lime Added double + band 2/10   Piriformis stretch 1min ea   Also given in sitting as alternative for HEP   Bridges 15x Lime  Added band 2/10   Small ball press into thigh with PB 10x2\"  Added 2/10   Endurance holds 10x4\"   Elevate hips in supine         Sidelying      Clams 15x lime Added 2/10   Reverse clalms 15x lime Added 2/10             Quadruped         Cat/cow 10x  Added 2/10   Thread needle 5x ea   Added 2/10; given alternative at counter in standing                       Prone         Quad stretch 1min       Other: HEP review, DB & PB, DOMINICK, urge suppression & further info (see above), bladder taught knack before effort, lumbar lordosis, sleep positions, lifting, posture, and household activities, Tra contraction/palpation, HEP review, IAP regulation, heat, position, mirror for biofeedback     Specific Instructions for next treatment: review bladder diary, assess baseline Biofeedback, may suggest estrogen cream/ different pessary if conservative management fails      Manual 1 min hook and pull stretch at introitus followed by   Neuro digital facilitation to PFM with 1 finger for 4sec endurance holds with 10sec rest break for 10reps Neuro digital facilitation   Pt with occasional need for tapping at posterior wall to engage PFM x12 min    Treatment Charges: Mins Units   []  Modalities     [x]  Ther Exercise 46 3   [x]  Manual Therapy 1 0   [x]  Ther Activities 10 1   []  Aquatics     []  Vasocompression     [x]  Other neuro red 12 1   Total Treatment time 69 5   59  Modifier on all charges    Assessment: [x] Progressing toward goals. Initiated tx with DB and soothing spa music to further assist relaxation for  proper PFM excursion. Proceeded with HEP and PREs. Pt able to perform PREs but has sig difficulty with DB & PB requiring cues throughout, good tech otherwise. Updated HEP via MB access code to include all new ex from today (noted in chart log). Pt also given lime band to further PREs & HEP. Ended session with introital stretch followed by neuro digital facilitation for PFM contraction by PT and tapping to engage PFM. Pt does well with this but does require occasional tapping cues to promote efficient contraction. Gave bladder diary for 3 days, pt understands and will complete. Pt leaves tx feeling encouraged. [] No change.      [] Other:  [x] Pt would benefit from physical therapy services in order to strength PF, core, and hips and to improve postural awareness/body mechanics.     STG: (to be met in 7 treatments)  1. Able to isolate PF musculature  2. ? Strength: PF endurance 6 seconds or greater    3. ? Function:no reports of leaking with exercise, cough, sneeze and laugh  4. Independent with Home Exercise Programs  5. Pt to demo ability to hold B SLR without compensation to indicate improved core stability  6. Pt will demo ability to expand PF during inhale of diaphragmatic breathing for improved PF relaxation/ROM     LTG: (to be met in 14 treatments)  1. Pt to achieve PF strength of 4/5 & endurance for >8 seconds for optimal PF function   2. Able to perform all advanced ADL's without leaking  3. Bilateral hips/gluteals/core strength to 4+/5 or greater for improve hip stability during activity   4. Pt will report 2-3 point improvement on ROS to indicate improved urogenital functioning    5. Pt to maintain B SLS for 30sec or greater without significant compensations to reduce load transfer and improve SL stability & endurance.      Patient goals: prevent further weakness    Pt. Education:  [x] Yes  [] No  [x] Reviewed Prior HEP/Ed  Method of Education: [x] Verbal  [x] Demo  [x] Written - updated via MB access code - gave bladder diary  Comprehension of Education:  [x] Verbalizes understanding. [x] Demonstrates understanding. [] Needs review. [] Demonstrates/verbalizes HEP/Ed previously given. Plan: [x] Continue current frequency toward long and short term goals.     [x] Specific Instructions for subsequent treatments: see above      Time In:510p           Time Out: 625p    Electronically signed by:  Jovana Melendrez, PT

## 2022-02-14 ENCOUNTER — HOSPITAL ENCOUNTER (OUTPATIENT)
Dept: PHYSICAL THERAPY | Facility: CLINIC | Age: 67
Setting detail: THERAPIES SERIES
Discharge: HOME | End: 2022-02-14
Payer: COMMERCIAL

## 2022-02-14 PROCEDURE — 97110 THERAPEUTIC EXERCISES: CPT

## 2022-02-14 PROCEDURE — 97530 THERAPEUTIC ACTIVITIES: CPT

## 2022-02-14 NOTE — FLOWSHEET NOTE
[] [facility]  [address]  [phone]  F: [phone] [x] [address]  [facility] [address]   [address]  P: [phone]  F: [phone] [] [address]  Therapy  [address] [facility] [address]  P: [phone]  F: [phone] [] [address]  P: [phone]  F: [phone] [] [address]  [facility]   [address]   [place]: [phone]  F: [phone]      Physical Therapy Daily Treatment Note    Date:  2022  Patient Name:  Amie Abdalla    :  1955  MRN: [id number]  Physician: Jovita Pack       Insurance: [employer] Zoe Seymour 150- visits based on med [facility]  Medical Diagnosis:   N81.10 (ICD-10-CM) - Prolapse of anterior vaginal wall   N39.3 (ICD-10-CM) - Stress incontinence   Rehab Codes: R27.8, R29.3, N39.3, N39.41, M62.81, N39.46  Onset Date: 21             Next 's appt. : 2/15/22 imaging  Visit# / total visits: 3/14  Cancels/No Shows: 0/0    Subjective:    Pain:  [] Yes  [x] No Location: n/a Pain Rating: (0-10 scale) 0/10  Pain altered Tx:  [x] No  [] Yes  Action:  Comments: Pt reports busy day today due to holiday and being a . She has noticed she did not have nocturia over the weekend. Does note she feels as if she is more in control of her voiding habits now. Objective:  Consent: Patient verbally consented to vaginal Internal/external manual work with no red flags present 2022. Patient was appropriately draped and only areas that were being treated were exposed. Therapist provided detailed explanation of treatment prior to initiation of session. Patient verbalized and demonstrated understanding and provided verbal consent.  Consent was checked and received prior to initiating different treatment techniques and checked frequently throughout session. & denies need for chaperone.  - not today - deferred by Physical Therapist for next visit  Exercises: AdXpose Access Code: [email or web address]  DB= diaphragmatic breathing  PB = pelvic brace (DB + kegel)   Exercise Reps/ Time Weight/ Level Comments   Pelvic model explanation x    For hook and pull stretch prior to endurance holds   Urge suppression  x    FOCUS ON RELAXATION- filling up the entire abdomen + PF cavity allowing PF to natural bulge       goal is to be at 8 sec count with stream  transitions when urges arises - move to standing, firm pressure etc  minimize just in case peeing  kegel with effort, transitions  keep ribs over pelvis  heel raises or toe curls with lower urges  5 quick flicks with lower urgency     sit<>stand   tighten PFM prior to standing then relax once in stranding  tighten prior to sitting and then relax once sitting     exhale with effort and lifting pelvic floor contraction  elevate hips during kegels                            1 min guided meditation HEP       Diaphragmatic breathing with PF lengthening 1min   Led by PT   LTR with core emphasis 10x Red swiss Added 2/10   DKTC with core emphasis 10x Red swiss Added 2/10   Bridges on ball  15x Red swiss Added 2/10   Happy Baby Stretch 1min       TrA + double bent knee fall out 15x lime Added double + band 2/10   Piriformis stretch 1min ea   Also given in sitting as alternative for HEP   Bridges 15x Lime  Added band 2/10   Small ball press into thigh with PB 10x4\"  Added 2/10   Endurance holds HEP   Elevate hips in supine; inc hold time 2/14         Sidelying      Clams 15x lime Added 2/10   Reverse clalms 15x lime Added 2/10             Quadruped         Cat/cow 10x4\"  Added 2/10   Thread needle 5x ea   Added 2/10; given alternative at counter in standing   Tall Kneel      Hip Hinge 5x   Added 2/14             Prone         Quad stretch 1min strap     Hip IR 15x Lime  Added 2/14 Other: HEP review, DB & PB with various ex, IAP regulation, new ex      Specific Instructions for next treatment: review bladder diary, PREs, assess baseline Biofeedback prn, may suggest estrogen cream/ different pessary if conservative management fails      Not today 2/14/22:  Manual 1 min hook and pull stretch at introitus followed by   Neuro digital facilitation to PFM with 1 finger for 4sec endurance holds with 10sec rest break for 10reps Neuro digital facilitation   Pt with occasional need for tapping at posterior wall to engage PFM x12 min    Treatment Charges: Mins Units   []  Modalities     [x]  Ther Exercise 44 3   []  Manual Therapy     [x]  Ther Activities 10 1   []  Aquatics     []  Vasocompression     []  Other neuro red     Total Treatment time 54 4       Assessment: [x] Progressing toward goals. Again, initiated tx with DB and soothing spa music to further assist relaxation for proper PFM excursion. Then proceeded with HEP and charted PREs. Pt able to perform PREs but has sig difficulty with DB & PB requiring cues throughout. Re-educated on IAP regulation & PB with pelvic model for pt to understand, also cueing throughout tx for reinforcement. Did not perform internal due to having recently done so 4 days ago. Will wait for next visit to ensure pt has good carry over with endurance holds. Updated HEP via MB access code to include all new ex from today (noted in chart log). Pt encouraged to complete bladder diary for 3 days, pt understands and will try to complete. [] No change. [] Other:  [x] Pt would benefit from physical therapy services in order to strength PF, core, and hips and to improve postural awareness/body mechanics.     STG: (to be met in 7 treatments)  1. Able to isolate PF musculature  2. ? Strength: PF endurance 6 seconds or greater    3. ? Function:no reports of leaking with exercise, cough, sneeze and laugh  4. Independent with Home Exercise Programs  5.  Pt to demo ability to hold B SLR without compensation to indicate improved core stability  6. Pt will demo ability to expand PF during inhale of diaphragmatic breathing for improved PF relaxation/ROM     LTG: (to be met in 14 treatments)  1. Pt to achieve PF strength of 4/5 & endurance for >8 seconds for optimal PF function   2. Able to perform all advanced ADL's without leaking  3. Bilateral hips/gluteals/core strength to 4+/5 or greater for improve hip stability during activity   4. Pt will report 2-3 point improvement on ROS to indicate improved urogenital functioning    5. Pt to maintain B SLS for 30sec or greater without significant compensations to reduce load transfer and improve SL stability & endurance.      Patient goals: prevent further weakness    Pt. Education:  [x] Yes  [] No  [x] Reviewed Prior HEP/Ed  Method of Education: [x] Verbal  [x] Demo  [x] Written - updated via MB access code  Comprehension of Education:  [x] Verbalizes understanding. [x] Demonstrates understanding. [] Needs review. [] Demonstrates/verbalizes HEP/Ed previously given. Plan: [x] Continue current frequency toward long and short term goals.     [x] Specific Instructions for subsequent treatments: see above      Time In:4p           Time Out: 5p    Electronically signed by:  Shiva Westbrook PT

## 2022-02-15 ENCOUNTER — HOSPITAL ENCOUNTER (OUTPATIENT)
Dept: MAMMOGRAPHY | Age: 67
Discharge: HOME | End: 2022-02-17
Payer: COMMERCIAL

## 2022-02-15 DIAGNOSIS — Z01.419 WOMEN'S ANNUAL ROUTINE GYNECOLOGICAL EXAMINATION: ICD-10-CM

## 2022-02-15 DIAGNOSIS — Z12.31 SCREENING MAMMOGRAM FOR BREAST CANCER: ICD-10-CM

## 2022-02-15 PROCEDURE — 77063 BREAST TOMOSYNTHESIS BI: CPT

## 2022-02-15 PROCEDURE — 77080 DXA BONE DENSITY AXIAL: CPT

## 2022-02-16 ENCOUNTER — CLINICAL DOCUMENTATION (OUTPATIENT)
Dept: OBGYN CLINIC | Age: 67
End: 2022-02-16

## 2022-02-16 DIAGNOSIS — M85.89 OSTEOPENIA OF MULTIPLE SITES: ICD-10-CM

## 2022-02-21 ENCOUNTER — HOSPITAL ENCOUNTER (OUTPATIENT)
Dept: PHYSICAL THERAPY | Facility: CLINIC | Age: 67
Setting detail: THERAPIES SERIES
Discharge: HOME | End: 2022-02-21
Payer: COMMERCIAL

## 2022-02-21 PROCEDURE — 97110 THERAPEUTIC EXERCISES: CPT

## 2022-02-21 PROCEDURE — 97112 NEUROMUSCULAR REEDUCATION: CPT

## 2022-02-21 PROCEDURE — 97530 THERAPEUTIC ACTIVITIES: CPT

## 2022-02-21 NOTE — FLOWSHEET NOTE
[] [facility] [address]  [address]  [phone]  F: [phone] [x] [address]  [facility] [address]   [address]  P: [phone]  F: [phone] [] [address]  Therapy  [address] [facility]  P: [phone]  F: [phone] [] [address]  P: [phone]  F: [phone] [] [address]  [facility]   [address]   [place]: [phone]  F: [phone]      Physical Therapy Daily Treatment Note    Date:  2022  Patient Name:  David Salas    :  1955  MRN: [id number]  Physician: Jovita Pack       Insurance: [employer] Zoe Seymour 150- visits based on med [facility]  Medical Diagnosis:   N81.10 (ICD-10-CM) - Prolapse of anterior vaginal wall   N39.3 (ICD-10-CM) - Stress incontinence   Rehab Codes: R27.8, R29.3, N39.3, N39.41, M62.81, N39.46  Onset Date: 21             Next 's appt. : 2/15/22 imaging  Visit# / total visits: 4/14  Cancels/No Shows: 0/0    Subjective:    Pain:  [] Yes  [x] No Location: n/a Pain Rating: (0-10 scale) 0/10  Pain altered Tx:  [x] No  [] Yes  Action:  Comments: Pt reports busy day today due to being off for the holiday. Pt has noticed 3 incidents with minor leakage with some urge presence. Pt reports she forgot to complete bladder diary. Objective:  Consent: Patient verbally consented to vaginal Internal/external manual work with no red flags present 2022. Patient was appropriately draped and only areas that were being treated were exposed. Therapist provided detailed explanation of treatment prior to initiation of session. Patient verbalized and demonstrated understanding and provided verbal consent.  Consent was checked and received prior to initiating different treatment techniques and checked frequently throughout session. & denies need for chaperone.    Exercises: Provasculon Access Code: [email or web address]  DB= diaphragmatic breathing  PB = pelvic brace (DB + kegel)   Exercise Reps/ Time Weight/ Level Comments   Pelvic model explanation x    For hook and pull stretch prior to endurance holds   Urge suppression      FOCUS ON RELAXATION- filling up the entire abdomen + PF cavity allowing PF to natural bulge       goal is to be at 8 sec count with stream  transitions when urges arises - move to standing, firm pressure etc  minimize just in case peeing  kegel with effort, transitions  keep ribs over pelvis  heel raises or toe curls with lower urges  5 quick flicks with lower urgency     sit<>stand   tighten PFM prior to standing then relax once in stranding  tighten prior to sitting and then relax once sitting     exhale with effort and lifting pelvic floor contraction  elevate hips during kegels                            1 min guided meditation HEP       Diaphragmatic breathing with PF lengthening HEP   Led by PT   LTR with core emphasis 10x Red swiss Added 2/10   DKTC with core emphasis + small ball OH 15x Red swiss Added 2/10; added OH small ball 2/21*will add to HEP once stable   Bridges on ball with OH ball flexion 15x Red swiss Added 2/10; added OH small ball 2/21*will add to HEP once stable   Happy Baby Stretch 1min       Piriformis stretch 1min ea   Also given in sitting as alternative for HEP   Small ball press into thigh with PB 10x7\"  Added 2/10   Endurance holds Neuro digital facilitation today    Elevate hips in supine; inc hold time 2/21          Sidelying                Quadruped         Cat/cow 10x4\"  Added 2/10   Thread needle 5x ea   Added 2/10; given alternative at counter in standing   Child's Pose 1min  Added ex 2/21*will add to HEP once able   Bird Dog Prep  - UEs & LEs seperately 5xea  Added ex 2/21*will add to HEP once stable   Bird Dog 3xea  Added ex 2/21*will add to HEP once stable Tall Kneel      Hip Hinge with paloff press into wall with ball 10x Swiss ball Added 2/14; added paloff press into wall with ball             Prone         Quad stretch 1min strap     Other: HEP review, new PREs with combo movement, DB & PB with various ex, IAP regulation, new ex, neuro digital faciliation     Specific Instructions for next treatment: review bladder diary, PREs, assess baseline Biofeedback prn, may suggest estrogen cream/ different pessary if conservative management fails      2/21/22:  Manual 1 min hook and pull stretch at introitus followed by   Neuro digital facilitation to PFM with 1 finger for 7sec endurance holds with 10sec rest break for 10reps  Pt with occasional need for tapping at posterior wall to engage PFM x8 min    Treatment Charges: Mins Units   []  Modalities     [x]  Ther Exercise 37 2   []  Manual Therapy 1 0   [x]  Ther Activities 10 1   []  Aquatics     []  Vasocompression     [x]  Other neuro red 8 1   Total Treatment time 56 4     Assessment: [x] Progressing toward goals. Initiated tx with neuro digital facilitation with increased endurance holds of 7 seconds for 8 minutes with 10 sec rest breaks. Pt with minimal difficulty with DB & PB, requiring occasional verbal cues & manual tapping at posterior wall throughout. Cont to re-educate on IAP regulation & PB with pelvic model for pt to understand, also cueing throughout tx for reinforcement. Pt able to increase endurance hold time today. Instructed to perform kegels 3x/daily 2 in supine, & 1-2 in seated position for further challenge by gravity. Then proceeded with HEP and charted PREs. Pt able to perform PREs but requires increased cues for bridges on ball with added combo movement  with small ball OH. Instructed pt perform in segmental fashion for added stabilization. Pt also with sig difficulty with bird dog, requiring manual blocking for balance at B hips and cueing to engage PB.  Did update HEP endurance holds to 7 secs, but did not new ex, as pt had increased difficulty with added combo movement; will incorporate in HEP once stable and able. Pt encouraged to complete bladder diary for 3 days, pt understands and will try to complete. [] No change. [] Other:  [x] Pt would benefit from physical therapy services in order to strength PF, core, and hips and to improve postural awareness/body mechanics.     STG: (to be met in 7 treatments)  1. Able to isolate PF musculature  2. ? Strength: PF endurance 6 seconds or greater    3. ? Function:no reports of leaking with exercise, cough, sneeze and laugh  4. Independent with Home Exercise Programs  5. Pt to demo ability to hold B SLR without compensation to indicate improved core stability  6. Pt will demo ability to expand PF during inhale of diaphragmatic breathing for improved PF relaxation/ROM     LTG: (to be met in 14 treatments)  1. Pt to achieve PF strength of 4/5 & endurance for >8 seconds for optimal PF function   2. Able to perform all advanced ADL's without leaking  3. Bilateral hips/gluteals/core strength to 4+/5 or greater for improve hip stability during activity   4. Pt will report 2-3 point improvement on ROS to indicate improved urogenital functioning    5. Pt to maintain B SLS for 30sec or greater without significant compensations to reduce load transfer and improve SL stability & endurance.      Patient goals: prevent further weakness    Pt. Education:  [x] Yes  [] No  [x] Reviewed Prior HEP/Ed  Method of Education: [x] Verbal  [x] Demo  [x] Written -updated kegels to 7sec holds via access cide   Comprehension of Education:  [x] Verbalizes understanding. [x] Demonstrates understanding. [] Needs review. [] Demonstrates/verbalizes HEP/Ed previously given. Plan: [x] Continue current frequency toward long and short term goals.     [x] Specific Instructions for subsequent treatments: see above      Time In:4p           Time Out: 009D    Electronically signed by:  Letitia Shaffer, PT

## 2022-02-22 DIAGNOSIS — M85.89 OSTEOPENIA OF MULTIPLE SITES: Primary | ICD-10-CM

## 2022-02-22 RX ORDER — RISEDRONATE SODIUM 35 MG/1
35 TABLET, FILM COATED ORAL
Qty: 4 TABLET | Refills: 11 | Status: SHIPPED | OUTPATIENT
Start: 2022-02-22

## 2022-03-03 ENCOUNTER — HOSPITAL ENCOUNTER (OUTPATIENT)
Dept: PHYSICAL THERAPY | Facility: CLINIC | Age: 67
Setting detail: THERAPIES SERIES
Discharge: HOME | End: 2022-03-03
Payer: COMMERCIAL

## 2022-03-03 PROCEDURE — 97112 NEUROMUSCULAR REEDUCATION: CPT

## 2022-03-03 PROCEDURE — 97110 THERAPEUTIC EXERCISES: CPT

## 2022-03-03 NOTE — FLOWSHEET NOTE
[] [facility] [address]  [address]  [phone]  F: [phone] [x] [address]  [facility]   [address]  P: [phone]  F: [phone] [] [place]  [address]  P: [phone]  F: [phone] [] [address]  P: [phone]  F: [phone] [] [address]  [facility]   [address]   [place]: [phone]  F: [phone]      Physical Therapy Daily Treatment Note    Date:  3/3/2022  Patient Name:  Montez Patch    :  1955  MRN: [id number]  Physician: Jovita Pack       Insurance: [employer]oe [employer] 150- visits based on med nec  Medical Diagnosis:   N81.10 (ICD-10-CM) - Prolapse of anterior vaginal wall   N39.3 (ICD-10-CM) - Stress incontinence   Rehab Codes: R27.8, R29.3, N39.3, N39.41, M62.81, N39.46  Onset Date: 21             Next 's appt. : 2/15/22 imaging  Visit# / total visits:   Cancels/No Shows: 0/0    Subjective:    Pain:  [] Yes  [x] No Location: n/a Pain Rating: (0-10 scale) 0/10  Pain altered Tx:  [x] No  [] Yes  Action:  Comments: Pt reports no major changes or adverse effects. Pt reports she is on the road to progress and feeling in control of her pelvic health. Objective:  Consent: Patient verbally consented to vaginal Internal/external manual work with no red flags present 3/3/2022. Patient was appropriately draped and only areas that were being treated were exposed. Therapist provided detailed explanation of treatment prior to initiation of session. Patient verbalized and demonstrated understanding and provided verbal consent.  Consent was checked and received prior to initiating different treatment techniques and checked frequently throughout session. & denies need for chaperone.    Exercises: Qonf Access Code: [email or web address]  DB= diaphragmatic breathing  PB = pelvic brace (DB + kegel)   Bolded completed 3/3/22  Exercise Reps/ Time Weight/ Level Comments   Pelvic model explanation x    For hook and pull stretch prior to endurance holds   Urge suppression      FOCUS ON RELAXATION- filling up the entire abdomen + PF cavity allowing PF to natural bulge       goal is to be at 8 sec count with stream  transitions when urges arises - move to standing, firm pressure etc  minimize just in case peeing  kegel with effort, transitions  keep ribs over pelvis  heel raises or toe curls with lower urges  5 quick flicks with lower urgency     sit<>stand   tighten PFM prior to standing then relax once in stranding  tighten prior to sitting and then relax once sitting     exhale with effort and lifting pelvic floor contraction  elevate hips during kegels                            1 min guided meditation HEP       Diaphragmatic breathing with PF lengthening HEP   Led by PT   LTR with core emphasis 10x Red swiss Added 2/10   DKTC with core emphasis + small ball OH 15x Red swiss Added 2/10; added OH small ball 2/21*will add to HEP once stable   Bridges on ball with OH ball flexion 15x Red swiss Added 2/10; added OH small ball 2/21*will add to HEP once stable   Happy Baby Stretch 1min       Piriformis stretch 1min ea   Also given in sitting as alternative for HEP   Small ball press into thigh with PB 10x7\"ea Small ball Added 2/10   Endurance holds Neuro digital facilitation today    Elevate hips in supine; inc hold time 2/21          Sidelying      Small ball press into mat with PB 10x7\"ea Small ball Added 3/3 - not in HEP yet             Quadruped         Cat/cow 10x4\"  Added 2/10   Thread needle 5x ea   Added 2/10; given alternative at counter in standing   Child's Pose 1min  Added ex 2/21*will add to HEP once able   Bird Dog Prep  - UEs & LEs seperately 10xea  Added ex 2/21*will add to HEP once wall throughout. Transitioned to PREs; pt able to perform PREs with less cues for bridges on ball & bird dog ex. Updated HEP to include (birddog, standing ITB, sit <> stand with paloff press). [] No change. [x] Other:Instructed to perform kegels 3x/daily 2 in supine, & 1-2 in seated position for further challenge by gravity. [x] Pt would benefit from physical therapy services in order to strength PF, core, and hips and to improve postural awareness/body mechanics.     STG: (to be met in 7 treatments)  1. Able to isolate PF musculature  2. ? Strength: PF endurance 6 seconds or greater    3. ? Function:no reports of leaking with exercise, cough, sneeze and laugh  4. Independent with Home Exercise Programs  5. Pt to demo ability to hold B SLR without compensation to indicate improved core stability  6. Pt will demo ability to expand PF during inhale of diaphragmatic breathing for improved PF relaxation/ROM     LTG: (to be met in 14 treatments)  1. Pt to achieve PF strength of 4/5 & endurance for >8 seconds for optimal PF function   2. Able to perform all advanced ADL's without leaking  3. Bilateral hips/gluteals/core strength to 4+/5 or greater for improve hip stability during activity   4. Pt will report 2-3 point improvement on ROS to indicate improved urogenital functioning    5. Pt to maintain B SLS for 30sec or greater without significant compensations to reduce load transfer and improve SL stability & endurance.      Patient goals: prevent further weakness    Pt. Education:  [x] Yes  [] No  [x] Reviewed Prior HEP/Ed  Method of Education: [x] Verbal  [x] Demo  [x] Written -updated via text with MB access code   Comprehension of Education:  [x] Verbalizes understanding. [x] Demonstrates understanding. [] Needs review. [] Demonstrates/verbalizes HEP/Ed previously given. Plan: [x] Continue current frequency toward long and short term goals.     [x] Specific Instructions for subsequent treatments: see above      Time In:405p           Time Out: 515p    Electronically signed by:  Antonia Gleason, PT

## 2022-03-10 ENCOUNTER — HOSPITAL ENCOUNTER (OUTPATIENT)
Dept: PHYSICAL THERAPY | Facility: CLINIC | Age: 67
Setting detail: THERAPIES SERIES
Discharge: HOME | End: 2022-03-10
Payer: COMMERCIAL

## 2022-03-10 PROCEDURE — 97110 THERAPEUTIC EXERCISES: CPT

## 2022-03-10 NOTE — FLOWSHEET NOTE
[] [facility]  [address]  [phone]  F: [phone] [x] [address]  [facility] 36   [address]  P: [phone]  F: [phone] [] [address] &  Therapy  [address] [facility]  P: [phone]  F: [phone] [] [address]  P: [phone]  F: [phone] [] [address]  [facility]   [address]   [place]: [phone]  F: [phone]      Physical Therapy Daily Treatment Note    Date:  3/10/2022  Patient Name:  Jesse Salazar    :  1955  MRN: [id number]  Physician: Jovita Pack       Insurance: [employer]- visits based on med nec  Medical Diagnosis:   N81.10 (ICD-10-CM) - Prolapse of anterior vaginal wall   N39.3 (ICD-10-CM) - Stress incontinence   Rehab Codes: R27.8, R29.3, N39.3, N39.41, M62.81, N39.46  Onset Date: 21             Next 's appt. : 2/15/22 imaging  Visit# / total visits:   Cancels/No Shows: 0/0     Subjective:    Pain:  [] Yes  [x] No Location: n/a Pain Rating: (0-10 scale) 0/10  Pain altered Tx:  [x] No  [] Yes  Action:  Comments: Pt reports no major changes & has been keeping active, going to the gym. Pt denies UI episodes & also denies having feelings of bladder \"falling out. \"       Objective:  Exercises: [employer] Access Code: [email or web address]  DB= diaphragmatic breathing  PB = pelvic brace (DB + kegel)   Bolded completed 3/3/22  Exercise Reps/ Time Weight/ Level Comments   Pelvic model explanation x    For hook and pull stretch prior to endurance holds   Urge suppression      FOCUS ON RELAXATION- filling up the entire abdomen + PF cavity allowing PF to natural bulge       goal is to be at 8 sec count with stream  transitions when urges arises - move to standing, firm pressure etc  minimize just in case peeing  kegel with effort, transitions  keep ribs over pelvis  heel raises or toe curls with lower urges  5 quick flicks with lower urgency     sit<>stand   tighten PFM prior to standing then relax once in stranding  tighten prior to sitting and then relax once sitting     exhale with effort and lifting pelvic floor contraction  elevate hips during kegels                            1 min guided meditation HEP       Diaphragmatic breathing with PF lengthening HEP   Led by PT   LTR with core emphasis 5x Red swiss Added 2/10   DKTC with core emphasis + small wt ball OH 15x Red swiss + 2.2lb ball Added 2/10; added OH small ball 2/21; weighted ball added 3/10    *will add to HEP once stable   Bridges on ball with OH am wt ball flexion 15x Red swiss  + 2.2lb ball Added 2/10; added OH small ball 2/21  weighted ball added 3/10  *will add to HEP once stable   Happy Baby Stretch 1min       Piriformis stretch 1min ea   Also given in sitting as alternative for HEP   HS stretch 1min ea  Added 3/10   Small ball press into thigh with PB 10x7\"ea Small ball Added 2/10   Endurance holds Various exercise   Elevate hips in supine; inc hold time 2/21          Sidelying      Small ball press into mat with PB 10x7\"ea Small ball Added 3/3 - not in HEP yet             Quadruped         Cat/cow 10x7\"  Added 2/10   Thread needle 5x ea   Added 2/10; given alternative at counter in standing   Child's Pose 1min Elevated with wedge Added ex 2/21*will add to HEP once able   Bird Dog Prep  - UEs & LEs seperately 10xea  Added ex 2/21*will add to HEP once stable; increased reps   Bird Dog 5xea  Added ex 2/21; added to HEP 3/3  Sig cues of all kinds for set up & tech   Primal Push up  5x  Added 3/10 - not in HEP         Tall Kneel      Hip Hinge with paloff press into wall with ball 10x Swiss ball Added 2/14; added paloff press into wall with ball             Prone         Quad stretch 1min strap HESHAM to increase stretch 3/3 StandSit to stand with Paloff press with PB 10x Red swiss Added 3/3 - Added to [place] on [employer] with PB + paloff hold wt ball 10x Red swiss  2.2lbs Added 3/3 - Not in HEP   Static on wall squat with resisted hip ER 10x ea Red swiss + blue band Added 3/10 - not in HEP   Quarter squat walks   Forward  lateral 10ftx2 ea Blue band Added 3/10 - not in HEP   ITB stretch at wall 1min   Added 3/3 - Added to HEP   Step up with PB 10x ea 4\" step Added 3/10 not in HEP   Other: HEP review: DB & PB with various ex, IAP regulation, new ex     Specific Instructions for next treatment: PREs with PB in various positions, assess baseline Biofeedback prn     NOT today: 3/10/22  Manual 1 min hook and pull stretch at introitus followed by   Neuro digital facilitation to PFM with 1 finger for 7sec endurance holds with 10sec rest break for 10reps  Pt with occasional need for tapping at posterior wall to engage PFM x8 min    Treatment Charges: Mins Units   []  Modalities     [x]  Ther Exercise 53 4   []  Manual Therapy     []  Ther Activities     []  Aquatics     []  Vasocompression     [x]  Other neuro red     Total Treatment time 53 4     Assessment: [x] Progressing toward goals. Pt with good tolerance to PREs, as noted in ex log above. Pt cont to struggle with bird dog needing manual cues and blocking of hips to perform correctly. Pt also with occasional need to verbal cueing for proper DB with PB to optimize PFM contraction. Post-tx pt feel motivated, feeling like she got a good workout of her core & PFM. [] No change. [x] Other:Instructed to perform kegels 3x/daily 2 in supine, & 1-2 in seated position for further challenge by gravity. [x] Pt would benefit from physical therapy services in order to strength PF, core, and hips and to improve postural awareness/body mechanics.     STG: (to be met in 7 treatments)  1.  Able to isolate PF musculature  2. ? Strength: PF endurance 6 seconds or greater    3. ? Function:no reports of leaking with exercise, cough, sneeze and laugh  4. Independent with Home Exercise Programs  5. Pt to demo ability to hold B SLR without compensation to indicate improved core stability  6. Pt will demo ability to expand PF during inhale of diaphragmatic breathing for improved PF relaxation/ROM     LTG: (to be met in 14 treatments)  1. Pt to achieve PF strength of 4/5 & endurance for >8 seconds for optimal PF function   2. Able to perform all advanced ADL's without leaking  3. Bilateral hips/gluteals/core strength to 4+/5 or greater for improve hip stability during activity   4. Pt will report 2-3 point improvement on ROS to indicate improved urogenital functioning    5. Pt to maintain B SLS for 30sec or greater without significant compensations to reduce load transfer and improve SL stability & endurance.      Patient goals: prevent further weakness    Pt. Education:  [x] Yes  [] No  [] Reviewed Prior HEP/Ed  Method of Education: [x] Verbal  [x] Demo  [] Written  Comprehension of Education:  [x] Verbalizes understanding. [] Demonstrates understanding. [x] Needs review. For tech & set up of new PREs  [] Demonstrates/verbalizes HEP/Ed previously given. Plan: [x] Continue current frequency toward long and short term goals.     [x] Specific Instructions for subsequent treatments: see above      Time In:405p           Time Out: 5p    Electronically signed by:  Letitia Shaffer, PT

## 2022-03-16 ENCOUNTER — TELEPHONE (OUTPATIENT)
Dept: OBGYN CLINIC | Age: 67
End: 2022-03-16

## 2022-03-16 RX ORDER — ESTRADIOL 0.1 MG/G
1 CREAM VAGINAL
Qty: 42.5 G | Refills: 3 | Status: SHIPPED | OUTPATIENT
Start: 2022-03-17

## 2022-03-16 NOTE — TELEPHONE ENCOUNTER
Pt called and is doing pelvic floor therapy and was told by them to have her GYN order estrogen cream .Mail in pharmacy she uses.

## 2022-03-17 ENCOUNTER — HOSPITAL ENCOUNTER (OUTPATIENT)
Dept: PHYSICAL THERAPY | Facility: CLINIC | Age: 67
Setting detail: THERAPIES SERIES
Discharge: HOME | End: 2022-03-17
Payer: COMMERCIAL

## 2022-03-17 PROCEDURE — 97110 THERAPEUTIC EXERCISES: CPT

## 2022-03-17 NOTE — FLOWSHEET NOTE
[] [facility]  [address]  [phone]  F: [phone] [x] [address]  [facility] 36   [address]  P: [phone]  F: [phone] [] [address] &  Therapy  [address] [facility]  P: [phone]  F: [phone] [] [address]  P: [phone]  F: [phone] [] [address]  [facility]   [address]   [place]: [phone]  F: [phone]      Physical Therapy Daily Treatment Note    Date:  3/17/2022  Patient Name:  Shivam Balbuena    :  1955  MRN: [id number]  Physician: Jovita Pack       Insurance: [employer]- visits based on med nec  Medical Diagnosis:   N81.10 (ICD-10-CM) - Prolapse of anterior vaginal wall   N39.3 (ICD-10-CM) - Stress incontinence   Rehab Codes: R27.8, R29.3, N39.3, N39.41, M62.81, N39.46  Onset Date: 21             Next 's appt. : 2/15/22 imaging  Visit# / total visits:   Cancels/No Shows: 0/0     Subjective:    Pain:  [] Yes  [x] No Location: n/a Pain Rating: (0-10 scale) 0/10  Pain altered Tx:  [x] No  [] Yes  Action:  Comments: Pt overall noticing improvement and is satisfied with her progress, noting 75-80% improvement of UUI     Objective:  Exercises: [employer] Access Code: [email or web address]  DB= diaphragmatic breathing  PB = pelvic brace (DB + kegel)   Bolded completed 3/3/22  Exercise Reps/ Time Weight/ Level Comments   Pelvic model explanation x    For hook and pull stretch prior to endurance holds   Urge suppression      FOCUS ON RELAXATION- filling up the entire abdomen + PF cavity allowing PF to natural bulge       goal is to be at 8 sec count with stream  transitions when urges arises - move to standing, firm pressure etc  minimize just in case peeing  kegel with effort, 1min strap HESHAM to increase stretch 3/3   StandSit to stand with Paloff press with PB & wall press 10x Red swiss Added 3/3 - Added to HEP; wall press added 3/17   Ball on [employer] with PB + paloff hold wt ball 10x Red swiss  2.2lbs  blue Added 3/3; added band around thighs 3/17 - Not in HEP   Static on wall squat with resisted hip ER 10x ea Red swiss + blue band Added 3/10 - not in HEP   Quarter squat walks   Forward  Lateral  Retro  10ftx2 ea Blue band Added 3/10; Added retro 3/17 - not in HEP   ITB stretch at wall 1min   Added 3/3 - Added to HEP   Step up with PB 10x ea 4\" step Added 3/10 not in HEP         Seated Swiss      Pelvic:  AP Tilts  Lateral Tilts  Circles 10xea Lg Blue Added 3/17   Dead Bug with PB 10x ea Lg Blue Added 3/17   Other: Education: HEP review: DB & PB with various ex, IAP regulation, new ex     Specific Instructions for next treatment: PREs with PB in various positions, assess baseline Biofeedback prn     NOT today: 3/17/22  Manual 1 min hook and pull stretch at introitus followed by   Neuro digital facilitation to PFM with 1 finger for 7sec endurance holds with 10sec rest break for 10reps  Pt with occasional need for tapping at posterior wall to engage PFM x8 min    Treatment Charges: Mins Units   []  Modalities     [x]  Ther Exercise 53 4   []  Manual Therapy     []  Ther Activities     []  Aquatics     []  Vasocompression     [x]  Other neuro red     Total Treatment time 53 4     Assessment: [x] Progressing toward goals. Pt reassessed meetings all STGs. See goal section for further details. Pt doing well and reports 75-80% improvement overall. Transitioned into higher level PREs with good tolerance, as noted in ex log above. Pt with better bird dog ex tech, not needing manual cues and blocking of hips today. Increased difficulty with R LE step up with PB on 4inch step, needing L toe tap onto step for steadiness.  Occasional cueing required throughout tx for proper execution of exercise and proper PB.     [] No change. [x] Other:Instructed to perform kegels 3x/daily 2 in supine, & 1-2 in seated position for further challenge by gravity. [x] Pt would benefit from physical therapy services in order to strength PF, core, and hips and to improve postural awareness/body mechanics.     STG: (to be met in 7 treatments) As of 3/17/22  1. Able to isolate PF musculature MET  2. ? Strength: PF endurance 6 seconds or greater  MET, able to hold 7 sec  3. ? Function: no reports of leaking with exercise, cough, sneeze and laugh MET, pt reports DOMINICK is 100% resolved only noticing UUI; Pt reports 75-80 % improvement of UUI  4. Independent with Home Exercise Programs MET   5. Pt to demo ability to hold B SLR without compensation to indicate improved core stability. MET  6. Pt will demo ability to expand PF during inhale of diaphragmatic breathing for improved PF relaxation/ROM. MET     LTG: (to be met in 14 treatments)  1. Pt to achieve PF strength of 4/5 & endurance for >8 seconds for optimal PF function   2. Able to perform all advanced ADL's without leaking  3. Bilateral hips/gluteals/core strength to 4+/5 or greater for improve hip stability during activity   4. Pt will report 2-3 point improvement on ROS to indicate improved urogenital functioning    5. Pt to maintain B SLS for 30sec or greater without significant compensations to reduce load transfer and improve SL stability & endurance.      Patient goals: prevent further weakness    Pt. Education:  [x] Yes  [] No  [x] Reviewed Prior HEP/Ed  Method of Education: [x] Verbal  [x] Demo new PREs as dated in ex log from today 3/17/22  [] Written  Comprehension of Education:  [x] Verbalizes understanding. [x] Demonstrates understanding. [x] Needs review. For tech & set up of new PREs  [] Demonstrates/verbalizes HEP/Ed previously given. Plan: [x] Continue current frequency toward long and short term goals.     [x] Specific Instructions for subsequent treatments: see above       Time In: 445p           Time Out: 540p    Electronically signed by:  Jovana Melendrez PT

## 2022-03-31 ENCOUNTER — HOSPITAL ENCOUNTER (OUTPATIENT)
Dept: PHYSICAL THERAPY | Facility: CLINIC | Age: 67
Setting detail: THERAPIES SERIES
Discharge: HOME | End: 2022-03-31
Payer: COMMERCIAL

## 2022-03-31 NOTE — FLOWSHEET NOTE
[] [facility]) - [facility] &  Therapy  [address]    [phone]  F: [phone]   [x] [address]  [facility] 36   [address]  P: [phone]  F: [phone]  [] [address]  [address]  P: [phone]  F: [phone] [] [address]  P: [phone]  F: [phone]  [] [address] [facility]   [address]   [place]: [phone]  F: [phone]   [] [address]  [place]: [phone]   F: [phone]     Physical Therapy Cancel/No Show note    Date: 3/31/2022  Patient: Arianna Maxwell  : 1955  MRN: [id number]    Cancels/No Shows to date: 0/0 - does not count against pt- therapist error     For today's appointment patient:    [x]  Cancelled    [] Rescheduled appointment    [] No-show     Reason given by patient:    []  Patient ill    []  Conflicting appointment    [] No transportation      [] Conflict with work    [] No reason given    [] Weather related    [] COVID-19    [x] Other:      Comments:  PT error with schedule - will not count against pt.        [x] Next appointment was confirmed    Electronically signed by: Jose Antonio Leyva, PT

## 2022-04-07 ENCOUNTER — HOSPITAL ENCOUNTER (OUTPATIENT)
Dept: PHYSICAL THERAPY | Facility: CLINIC | Age: 67
Setting detail: THERAPIES SERIES
Discharge: HOME | End: 2022-04-07
Payer: COMMERCIAL

## 2022-04-07 PROCEDURE — 97110 THERAPEUTIC EXERCISES: CPT

## 2022-04-07 NOTE — FLOWSHEET NOTE
[] [facility]  [address]  [phone]  F: [phone] [x] [address]  [facility] 36   [address]  P: [phone]  F: [phone] [] [address]  [address] [facility]  P: [phone]  F: [phone] [] [address]  P: [phone]  F: [phone] [] [address]  [facility]   [address]   [place]: [phone]  F: [phone]      Physical Therapy Daily Treatment Note    Date:  2022  Patient Name:  Henry Rainey    :  1955  MRN: [id number]  Physician: Jovita Pack       Insurance: [employer]- visits based on med nec  Medical Diagnosis:   N81.10 (ICD-10-CM) - Prolapse of anterior vaginal wall   N39.3 (ICD-10-CM) - Stress incontinence   Rehab Codes: R27.8, R29.3, N39.3, N39.41, M62.81, N39.46  Onset Date: 21             Next 's appt. : 2/15/22 imaging  Visit# / total visits:   Cancels/No Shows: 0/0     Subjective:    Pain:  [] Yes  [x] No Location: n/a Pain Rating: (0-10 scale) 0/10  Pain altered Tx:  [x] No  [] Yes  Action:  Comments: Pt overall has noticed improvements, but had once instance of leakage today. Has been getting better about urge suppression though & has been actively practicing various urge techniques. 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        Objective:  Exercises: [employer] Access Code: [email or web address]  DB= diaphragmatic breathing  PB = pelvic brace (DB + kegel)   Bolded completed 22  Exercise Reps/ Time Weight/ Level Comments   Pelvic model explanation x    For hook and pull stretch prior to endurance holds   Urge suppression      FOCUS ON RELAXATION- filling up the entire abdomen + PF cavity allowing PF to natural bulge       goal is to be at 8 sec count with stream  transitions when urges arises - move to standing, firm pressure etc  minimize just in case peeing  kegel with effort, transitions  keep ribs over pelvis  heel raises or toe curls with lower urges  5 quick flicks with lower urgency     sit<>stand   tighten PFM prior to standing then relax once in stranding  tighten prior to sitting and then relax once sitting     exhale with effort and lifting pelvic floor contraction  elevate hips during kegels                            1 min guided meditation HEP       Diaphragmatic breathing with PF lengthening HEP   Led by PT   LTR with core emphasis 5x Red swiss Added 2/10   DKTC with core emphasis + small wt ball OH 15x Red swiss + 2.2lb ball Added 2/10; added OH small ball 2/21; weighted ball added 3/10    *will add to HEP once stable   Bridges on ball with OH am wt ball flexion 15x Red swiss  + 2.2lb ball Added 2/10; added OH small ball 2/21  weighted ball added 3/10  *will add to HEP once stable   Happy Baby Stretch 1min       Piriformis stretch 1min ea   Also given in sitting as alternative for HEP   HS stretch 1min ea  Added 3/10   Small ball press into thigh with PB 10x7\"ea Small ball Added 2/10   Endurance holds With  Various exercise   Elevate hips in supine; inc hold time 2/21          Sidelying      Small ball press into mat with PB 10x7\"ea Small ball Added 3/3 - not in HEP yet             Quadruped         Cat/cow 10x7\"  Added 2/10   Thread needle 5x ea   Added 2/10; given alternative at counter in standing   Child's Pose  Child Pose angled to each side 1min  Added ex 2/21*will add to HEP once able;  Added extra stretch with BUEs offset each side 4/7   Bird Dog Prep  - UEs & LEs seperately 10xea  Added ex 2/21*will add to HEP once stable; increased reps   Bird Dog 5xea  Added ex 2/21; added to HEP 3/3  Sig cues of all kinds for set up & tech   Primal Push up  5x5\" lime Added 3/10; added band 3/17 & inc hold time - not in HEP Fire hydrant 10x lime Added 3/17 - not in HEP         Tall Kneel      Hip Hinge with paloff press into wall with ball 10x Swiss ball Added 2/14; added paloff press into wall with ball             Prone         Quad stretch 1min strap HESHAM to increase stretch 3/3   StandSit to stand with Paloff press with PB & wall press 10x Red swiss Added 3/3 - Added to HEP; wall press added 3/17   Ball on [employer] with PB + paloff hold wt ball 10x Red swiss  2.2lbs  blue Added 3/3; added band around thighs 3/17 - Not in HEP   Static on wall squat with resisted hip ER 10x ea Red swiss + blue band Added 3/10 - not in HEP   Quarter squat walks   Forward  Lateral  Retro  10ftx2 ea Blue band Added 3/10;  Added retro 3/17 - not in HEP   ITB stretch at wall 1min   Added 3/3 - Added to HEP   Step up with PB 10x ea 4\" step Added 3/10 not in HEP   3 way cone taps with PB 10xea 3 cones Add 4/7; maintain PB with 3 taps, then relax          Tino      Leg Press with PB  DL  SL   10x  10xea   2 plates  1 plate Added 4/7   Hip ABD/ADD  10xea 5 plates Added 4/7                           Seated Swiss      Paloff press with PB  10xea Green swiss Added 4/7   Pelvic:  AP Tilts  Lateral Tilts  Circles 10xea Lg Blue Added 3/17   Dead Bug with PB 10x ea Lg Blue Added 3/17   Other: Education: HEP review: DB & PB with various ex, IAP regulation, new ex     Specific Instructions for next treatment: PREs with PB in various positions, assess baseline Biofeedback prn     NOT today: 4/7/22  Manual 1 min hook and pull stretch at introitus followed by   Neuro digital facilitation to PFM with 1 finger for 7sec endurance holds with 10sec rest break for 10reps  Pt with occasional need for tapping at posterior wall to engage PFM x8 min    Treatment Charges: Mins Units   []  Modalities     [x]  Ther Exercise 40 3   []  Manual Therapy     []  Ther Activities     []  Aquatics     []  Vasocompression     [x]  Other neuro red     Total Treatment time 40 3 Verbal  [x] Demo new PREs as dated in ex log from today 4/7/22  [] Written  Comprehension of Education:  [x] Verbalizes understanding. [x] Demonstrates understanding. [x] Needs review. For tech & set up of new PREs  [x] Demonstrates/verbalizes HEP/Ed previously given. Plan: [x] Continue current frequency toward long and short term goals.     [x] Specific Instructions for subsequent treatments: see above       Time In: 410p           Time Out: 502p    Electronically signed by:  Alda Cruz PT

## 2022-04-14 ENCOUNTER — APPOINTMENT (OUTPATIENT)
Dept: PHYSICAL THERAPY | Facility: CLINIC | Age: 67
End: 2022-04-14
Payer: COMMERCIAL

## 2022-04-21 ENCOUNTER — APPOINTMENT (OUTPATIENT)
Dept: PHYSICAL THERAPY | Facility: CLINIC | Age: 67
End: 2022-04-21
Payer: COMMERCIAL

## 2022-04-28 ENCOUNTER — HOSPITAL ENCOUNTER (OUTPATIENT)
Dept: PHYSICAL THERAPY | Facility: CLINIC | Age: 67
Setting detail: THERAPIES SERIES
Discharge: HOME | End: 2022-04-28
Payer: COMMERCIAL

## 2022-04-28 PROCEDURE — 90912 BFB TRAINING 1ST 15 MIN: CPT

## 2022-04-28 PROCEDURE — 90913 BFB TRAINING EA ADDL 15 MIN: CPT

## 2022-04-28 PROCEDURE — 97110 THERAPEUTIC EXERCISES: CPT

## 2022-04-28 NOTE — FLOWSHEET NOTE
[] [facility] [facility].  [address]  [phone]  F: [phone] [x] [address] [facility] 36   [address]  P: [phone]  F: [phone] [] [address] &  Therapy  [address] [facility]  P: [phone]  F: [phone] [] [address]  P: [phone]  F: [phone] [] [address]  [facility]   [address]   [place]: [phone]  F: [phone]      Physical Therapy Daily Treatment Note    Date:  2022  Patient Name:  Khadijah Trinh    :  1955  MRN: [id number]  Physician: Jovita Pack       Insurance: [employer]oe Seymour 150- visits based on med [facility]  Medical Diagnosis:   N81.10 (ICD-10-CM) - Prolapse of anterior vaginal wall   N39.3 (ICD-10-CM) - Stress incontinence   Rehab Codes: R27.8, R29.3, N39.3, N39.41, M62.81, N39.46  Onset Date: 21             Next 's appt. : 2/15/22 imaging  Visit# / total visits:   Cancels/No Shows: 0/0     Subjective:    Pain:  [] Yes  [x] No Location: n/a Pain Rating: (0-10 scale) 0/10  Pain altered Tx:  [x] No  [] Yes  Action:  Comments: Pt overall has noticed improvements. Does notice sometimes feeling camarena and with pressure, but reports less episodes of UI. Objective:  Consent: Patient verbally consented to internal /external manual palpation for set up with biofeedback sensor & biofeedback training with no red flags present 2022. Patient was appropriately draped and only areas that were being treated were exposed. Therapist provided detailed explanation of treatment prior to initiation of session. Patient verbalized and demonstrated understanding and provided verbal consent.  Consent was checked and received prior to initiating different treatment techniques and checked frequently throughout session. Chaperone declined.    Exercises: Cloudkick Access Code: [email or web address]  DB= diaphragmatic breathing  PB = pelvic brace (DB + kegel)   Bolded completed 4/28/22  Exercise Reps/ Time Weight/ Level Comments   Pelvic model explanation x    For hook and pull stretch prior to endurance holds   Urge suppression      FOCUS ON RELAXATION- filling up the entire abdomen + PF cavity allowing PF to natural bulge       goal is to be at 8 sec count with stream  transitions when urges arises - move to standing, firm pressure etc  minimize just in case peeing  kegel with effort, transitions  keep ribs over pelvis  heel raises or toe curls with lower urges  5 quick flicks with lower urgency     sit<>stand   tighten PFM prior to standing then relax once in stranding  tighten prior to sitting and then relax once sitting     exhale with effort and lifting pelvic floor contraction  elevate hips during kegels                            1 min guided meditation HEP       Diaphragmatic breathing with PF lengthening HEP   Led by PT   LTR with core emphasis 5x Red swiss Added 2/10   DKTC with core emphasis + small wt ball OH 15x Red swiss + 2.2lb ball Added 2/10; added OH small ball 2/21; weighted ball added 3/10    *will add to HEP once stable   Bridges on ball with OH am wt ball flexion 15x Red swiss  + 2.2lb ball Added 2/10; added OH small ball 2/21  weighted ball added 3/10  *will add to HEP once stable   Happy Baby Stretch 1min       Piriformis stretch 1min ea   Also given in sitting as alternative for HEP   HS stretch 1min ea  Added 3/10   SL Bridges 5x2ea  Added 4/28   Small ball press into thigh with PB 10x7\"ea Small ball Added 2/10   Endurance holds With  Various exercise   Elevate hips in supine; inc hold time 2/21          Sidelying      Small ball press into mat with PB 10x7\"ea Small ball Added 3/3 - not in HEP yet   Hip Circles with PB 10xea  Added 4/28; on forearm    ADD circles with PB 10xea  Added 4/28           Quadruped         Cat/cow 10x7\"  Added 2/10   Thread needle 5x ea   Added 2/10; given alternative at counter in standing   Child's Pose  Child Pose angled to each side 1min  Added ex 2/21*will add to HEP once able; Added extra stretch with BUEs offset each side 4/7   Bird Dog Prep  - UEs & LEs seperately 10xea  Added ex 2/21*will add to HEP once stable; increased reps   Bird Dog 5xea  Added ex 2/21; added to HEP 3/3  Sig cues of all kinds for set up & tech   Primal Push up  5x5\" lime Added 3/10; added band 3/17 & inc hold time - not in HEP   Fire hydrant 10x lime Added 3/17 - not in HEP         Tall Kneel      Hip Hinge with paloff press into wall with ball 10x Small ball Added 2/14; added paloff press into wall with ball- no rest on heels             Prone         Quad stretch 1min strap HESHAM to increase stretch 3/3   StandSit to stand with Paloff press with PB & wall press 10x Red swiss Added 3/3 - Added to HEP; wall press added 3/17   Ball on [employer] with PB + paloff hold wt ball 10x Red swiss  2.2lbs  blue Added 3/3; added band around thighs 3/17 - Not in HEP   Static on wall squat with resisted hip ER 10x ea Red swiss + blue band Added 3/10 - not in HEP   Quarter squat walks   Forward  Lateral  Retro  10ftx2 ea Blue band Added 3/10;  Added retro 3/17 - not in HEP   ITB stretch at wall 1min   Added 3/3 - Added to HEP   Step up with PB 10x ea 4\" step Added 3/10 not in HEP   3 way cone taps with PB 10xea 3 cones Add 4/7; maintain PB with 3 taps, then relax          Tino      Leg Press with PB  DL  SL   10x  10xea   2 plates  1 plate Added 4/7   Hip ABD/ADD  10xea 5 plates Added 4/7                           Seated Swiss      Paloff press with PB  10xea Green swiss Added 4/7   Pelvic:  AP Tilts  Lateral Tilts  Circles 10xea Lg Blue Added 3/17   Dead Bug with PB 10x ea Lg Blue Added 3/17   Other: Education:DB & PB with various ex, IAP regulation, new ex & biofeedback      Specific Instructions for next treatment: Cont biofeedback per pt preference, PREs with PB in various positions     Biofeedback: Internal vaginal sensor 30 min total   15 minutes for set up, discussion on how to and max cueing throughout for proper DB with PB. Pt needs towel roll to keep sensor in place & reinforcement by PT. Endurance 10\" hold 10\" rest; 20 trials  Quick Flicks  2\" hold 4\" rest; 20 trials    NOT today: 4/28/22  Manual 1 min hook and pull stretch at introitus followed by   Neuro digital facilitation to PFM with 1 finger for 7sec endurance holds with 10sec rest break for 10reps  Pt with occasional need for tapping at posterior wall to engage PFM x8 min    Treatment Charges: Mins Units   []  Modalities     [x]  Ther Exercise 30 2   []  Manual Therapy     []  Ther Activities     []  Aquatics     []  Vasocompression     [x]  Other Biofeedback  30 2   Total Treatment time 60 4     Assessment: [x] Progressing toward goals. Began tx with education & discussion re: trying out vaginal biofeedback - pt agreeable; therefore, initiated biofeedback with vaginal electrode to help pt train PFM for coordination/strength & relaxation to assist with PFM weakness; assistance needed to correctly insert vaginal electrode & keep it in place - vertical towel roll placed for manual pressure to keep in place. Pt overall good with initiating proper pelvic brace per PT observation & biofeedback results, but has difficulty maintaining it for 10sec. Will cont to assess this at subsequent visits for specifics as today's biofeedback session was more for baseline. Discussed with pt that we could attempt internal route next time if she felt comfortable, as PT can help assist proper PFM contraction with tactile neuromuscular re-education. Pt is going to think about it and report back next visit. Pt requires min cueing for proper DB with PB and PFM contraction throughout tx & especially during biofeedback.  Did not update HEP, as pt has an extensive one in place. Post-tx pt reports feels like she got a good workout. [] No change. [x] Other:Instructed to perform kegels 3x/daily 2 in supine, & 1-2 in seated position for further challenge by gravity. [x] Pt would benefit from physical therapy services in order to strength PF, core, and hips and to improve postural awareness/body mechanics.     STG: (to be met in 7 treatments) As of 3/17/22  1. Able to isolate PF musculature MET  2. ? Strength: PF endurance 6 seconds or greater  MET, able to hold 7 sec  3. ? Function: no reports of leaking with exercise, cough, sneeze and laugh MET, pt reports DOMINICK is 100% resolved only noticing UUI; Pt reports 75-80 % improvement of UUI  4. Independent with Home Exercise Programs MET   5. Pt to demo ability to hold B SLR without compensation to indicate improved core stability. MET  6. Pt will demo ability to expand PF during inhale of diaphragmatic breathing for improved PF relaxation/ROM. MET     LTG: (to be met in 14 treatments)  1. Pt to achieve PF strength of 4/5 & endurance for >8 seconds for optimal PF function   2. Able to perform all advanced ADL's without leaking  3. Bilateral hips/gluteals/core strength to 4+/5 or greater for improve hip stability during activity   4. Pt will report 2-3 point improvement on ROS to indicate improved urogenital functioning    5. Pt to maintain B SLS for 30sec or greater without significant compensations to reduce load transfer and improve SL stability & endurance.      Patient goals: prevent further weakness    Pt. Education:  [x] Yes  [] No  [] Reviewed Prior HEP/Ed  Method of Education: [x] Verbal  [x] Demo PB for PREs as dated in ex log from today    Education:DB & PB with various ex, IAP regulation, new ex & biofeedback     [] Written  Comprehension of Education:  [x] Verbalizes understanding. [x] Demonstrates understanding. [x] Needs review.  For tech & set up of new PREs  [] Demonstrates/verbalizes HEP/Ed previously given.     Plan: [x] Continue current frequency toward long and short term goals.     [x] Specific Instructions for subsequent treatments: see above       Time In: 4p           Time Out: 505p    Electronically signed by:  Tasha Arreguin PT

## 2022-05-05 ENCOUNTER — APPOINTMENT (OUTPATIENT)
Dept: PHYSICAL THERAPY | Facility: CLINIC | Age: 67
End: 2022-05-05
Payer: COMMERCIAL

## 2022-05-12 ENCOUNTER — APPOINTMENT (OUTPATIENT)
Dept: PHYSICAL THERAPY | Facility: CLINIC | Age: 67
End: 2022-05-12
Payer: COMMERCIAL

## 2022-05-26 ENCOUNTER — HOSPITAL ENCOUNTER (OUTPATIENT)
Dept: PHYSICAL THERAPY | Facility: CLINIC | Age: 67
Setting detail: THERAPIES SERIES
Discharge: HOME | End: 2022-05-26
Payer: COMMERCIAL

## 2022-05-26 PROCEDURE — 97110 THERAPEUTIC EXERCISES: CPT

## 2022-05-26 NOTE — DISCHARGE SUMMARY
[] [facility]) - [facility] &  Therapy  [address]  [phone]  F: [phone] [x] [address]  [facility] 36   [address]  P: [phone]  F: [phone] [] [address] &  Therapy  [address] [facility] [address]  P: [phone]  F: [phone] [] [address]  P: [phone]  F: [phone] [] [address]  [facility]   [address]   [place]: [phone]  F: [phone]      Physical Therapy Discharge Note    Date: 2022      Patient: Stanley   : 1955  MRN: [id number]    Physician: Jovita Pack       Insurance: [employer]- visits based on med nec  Medical Diagnosis:   N81.10 (ICD-10-CM) - Prolapse of anterior vaginal wall   N39.3 (ICD-10-CM) - Stress incontinence   Rehab Codes: R27.8, R29.3, N39.3, N39.41, M62.81, N39.46  Onset Date: 21             Next 's appt.: unknown  Visit# / total visits: 10/14       Cancels/No Shows: 0/0  Date of initial visit: 22                Date of final visit: 22    Subjective:    Pain:  []? Yes  [x]? No   Location: n/a   Pain Rating: (0-10 scale) 0/10  Pain altered Tx:  [x]? No  []? Yes  Action:  Comments: Pt would like to be discharged as she is feeling overall improved. Objective:  Test Measurements: see below LTGS  Function:see below LTGS    Assessment:  [x]? Progressing toward goals. Pt met all LTGs and would like to be discharged as she is feeling overall improved. Reviewed HEP with good pt understanding. Pt feels motivated and confident with skills gain with PF PT.                                STG: (to be met in 7 treatments) As of 3/17/22  1.  Able to isolate PF musculature MET  2. ? Strength: PF endurance 6 seconds or greater  MET, able to hold 7 sec  3. ? Function: no reports of leaking with exercise, cough, sneeze and laugh MET, pt reports DOMINICK is 100% resolved only noticing UUI; Pt reports 75-80 % improvement of UUI  4. Independent with Home Exercise Programs MET   5. Pt to demo ability to hold B SLR without compensation to indicate improved core stability. MET  6. Pt will demo ability to expand PF during inhale of diaphragmatic breathing for improved PF relaxation/ROM. MET     LTG: (to be met in 14 treatments) As of 5/26/22  1. Pt to achieve PF strength of 4/5 & endurance for >8 seconds for optimal PF function. MET, 4/5 with 10 sec endurance   2. Able to perform all advanced ADL's without leaking. MET, pt reports she is able to do so without DOMINICK  3. Bilateral hips/gluteals/core strength to 4+/5 or greater for improve hip stability during activity. MET, 4+/5 with all   4. Pt will report 2-3 point improvement on ROS to indicate improved urogenital functioning. MET, pt with 11 point reduction - scored 2 points = 8.3% impairment  5. Pt to maintain B SLS for 30sec or greater without significant compensations to reduce load transfer and improve SL stability & endurance. MET, B SLS 30sec      Patient goals: prevent further weakness  Treatment to Date:  [x]? Therapeutic Exercise   20321             []? Iontophoresis: 4 mg/mL Dexamethasone Sodium Phosphate  mAmin  76531   [x]? Therapeutic Activity  30474 []? Vasopneumatic cold with compression  [id number]               []? Gait Training    41108 []? Ultrasound        [id number]   [x]? Neuromuscular Re-education  [id number] [x]? Electrical Stimulation Unattended  [id number]   [x]? Manual Therapy  98819 [x]? Electrical Stimulation Attended  [id number]   [x]? Instruction in HEP       []? Lumbar/Cervical Traction  [id number]   []? Aquatic Therapy           [id number] [x]? Cold/hotpack     []? Massage   56357      []? Dry Needling, 1 or 2 muscles  15988   [x]? Biofeedback, first 15 minutes   79242  [x]? Biofeedback, additional 15 minutes   04830 []?  Dry Needling, 3 or more muscles  20561        Discharge Status:     [x] Pt recovered from conditions. Treatment goals were met. [x] Pt received maximum benefit. No further therapy indicated at this time. [x] Pt to continue exercise/home instructions independently. Electronically signed by Jose Antonio Leyva PT on 5/26/2022 at 4:38 PM      If you have any questions or concerns, please don't hesitate to call.   Thank you for your referral.

## 2022-05-26 NOTE — FLOWSHEET NOTE
[] [address]  [address]  [phone]  F: [phone] [x] [address]  [facility]   [address]  P: [phone]  F: [phone] [] [place]  [address] [facility] [address]  P: [phone]  F: [phone] [] [address]  P: [phone]  F: [phone] [] [address]  [facility]   [address]   [place]: [phone]  F: [phone]      Physical Therapy Daily Treatment Note    Date:  2022  Patient Name:  Azar Sadler    :  1955  MRN: [id number]  Physician: Jovita Pack       Insurance: [employer]- visits based on med nec  Medical Diagnosis:   N81.10 (ICD-10-CM) - Prolapse of anterior vaginal wall   N39.3 (ICD-10-CM) - Stress incontinence   Rehab Codes: R27.8, R29.3, N39.3, N39.41, M62.81, N39.46  Onset Date: 21             Next 's appt. : 2/15/22 imaging  Visit# / total visits: 10/14  Cancels/No Shows: 0/0     Subjective:    Pain:  [] Yes  [x] No Location: n/a Pain Rating: (0-10 scale) 0/10  Pain altered Tx:  [x] No  [] Yes  Action:  Comments: Pt would like to be discharged as she is feeling overall improved.       Objective:  Exercises: Bl[employer]ent Access Code: [email or web address]  DB= diaphragmatic breathing  PB = pelvic brace (DB + kegel)   Exercise Reps/ Time Weight/ Level Comments   Pelvic model explanation x    For hook and pull stretch prior to endurance holds   Urge suppression      FOCUS ON RELAXATION- filling up the entire abdomen + PF cavity allowing PF to natural bulge       goal is to be at 8 sec count with stream  transitions when urges arises - move to standing, firm pressure etc  minimize just in case peeing  kegel with effort, transitions  keep ribs over pelvis  heel raises or toe curls press into wall with ball 10x Small ball Added 2/14; added paloff press into wall with ball- no rest on heels             Prone         Quad stretch 1min strap HESHAM to increase stretch 3/3   StandSit to stand with Paloff press with PB & wall press 10x Red swiss Added 3/3 - Added to HEP; wall press added 3/17   Ball on [employer] with PB + paloff hold wt ball 10x Red swiss  2.2lbs  blue Added 3/3; added band around thighs 3/17 - Not in HEP   Static on wall squat with resisted hip ER 10x ea Red swiss + blue band Added 3/10 - not in HEP   Quarter squat walks   Forward  Lateral  Retro  10ftx2 ea Blue band Added 3/10; Added retro 3/17 - not in HEP   ITB stretch at wall 1min   Added 3/3 - Added to HEP   Step up with PB 10x ea 4\" step Added 3/10 not in HEP   3 way cone taps with PB 10xea 3 cones Add 4/7; maintain PB with 3 taps, then relax          Tino      Leg Press with PB  DL  SL   10x  10xea   2 plates  1 plate Added 4/7   Hip ABD/ADD  10xea 5 plates Added 4/7                           Seated Swiss      Paloff press with PB  10xea Green swiss Added 4/7   Pelvic:  AP Tilts  Lateral Tilts  Circles 10xea Lg Blue Added 3/17   Dead Bug with PB 10x ea Lg Blue Added 3/17   Other: Education: DC/ HEP Review    Treatment Charges: Mins Units   []  Modalities     [x]  Ther Exercise 30 2   []  Manual Therapy     []  Ther Activities     []  Aquatics     []  Vasocompression     []  Other Biofeedback      Total Treatment time 30 2     Assessment: [x] Progressing toward goals. Pt met all LTGs and would like to be discharged as she is feeling overall improved. Reviewed HEP with good pt understanding. Pt feels motivated and confident with skills gain with PF PT.          STG: (to be met in 7 treatments) As of 3/17/22  1.  Able to isolate PF musculature MET  2. ? Strength: PF endurance 6 seconds or greater  MET, able to hold 7 sec  3. ? Function: no reports of leaking with exercise, cough, sneeze and laugh MET, pt reports DOMINICK is 100% resolved only noticing UUI; Pt reports 75-80 % improvement of UUI  4. Independent with Home Exercise Programs MET   5. Pt to demo ability to hold B SLR without compensation to indicate improved core stability. MET  6. Pt will demo ability to expand PF during inhale of diaphragmatic breathing for improved PF relaxation/ROM. MET     LTG: (to be met in 14 treatments) As of 5/26/22  1. Pt to achieve PF strength of 4/5 & endurance for >8 seconds for optimal PF function. MET, 4/5 with 10 sec endurance   2. Able to perform all advanced ADL's without leaking. MET, pt reports she is able to do so without DOMINICK  3. Bilateral hips/gluteals/core strength to 4+/5 or greater for improve hip stability during activity. MET, 4+/5 with all   4. Pt will report 2-3 point improvement on ROS to indicate improved urogenital functioning. MET, pt with 11 point reduction - scored 2 points = 8.3% impairment  5. Pt to maintain B SLS for 30sec or greater without significant compensations to reduce load transfer and improve SL stability & endurance. MET, B SLS 30sec      Patient goals: prevent further weakness    Pt. Education:  [x] Yes  [] No  [x] Reviewed Prior HEP/Ed  Method of Education: [x] Verbal  [x] Demo   [x] Written  Access Code: [email or web address]  URL: [email or web address]  Date: 05/26/2022  Prepared by: Cisco Suarez    Program Notes  Ask about Estrogen Cream?  Hook stretch and pull down sustained firm pressure 1min.    Scar tissue massage on perineum 3-5min all ways     FOCUS ON RELAXATION- filling up the entire abdomen + PF cavity allowing PF to natural bulge       goal is to be at 8 sec count with stream  transitions when urges arises - move to standing, firm pressure etc  minimize just in case peeing  kegel with effort, transitions  keep ribs over pelvis  heel raises or toe curls with lower urges  5 quick flicks with lower urgency     sit<>stand   tighten PFM prior to standing then relax once in stranding  tighten prior to sitting and then relax once sitting     exhale with effort and lifting pelvic floor contraction  elevate hips during kegels        Exercises  1 Minute Guided Meditation - 1 x daily - 7 x weekly - 1 sets - 1 reps - 1 hold  Supine Diaphragmatic Breathing with Pelvic Floor Lengthening - 1 x daily - 7 x weekly - 1-2 sets - 10 reps - 2 hold  Supine Transversus Abdominis Bracing - Hands on Stomach - 1 x daily - 7 x weekly - 1-2 sets - 10 reps - 2 hold  Supine Bridge with Resistance Band - 1 x daily - 7 x weekly - 1-2 sets - 15 reps  Supine Pelvic Floor Stretch - 1 x daily - 7 x weekly - 1 sets - 1 reps - 60 hold  Bent Knee Fallouts - 1 x daily - 7 x weekly - 1-2 sets - 10 reps - 2 hold  Hooklying Clamshell with Resistance - 1 x daily - 7 x weekly - 15 sets - 10 reps  Supine Piriformis Stretch with Foot on Ground - 1 x daily - 7 x weekly - 1 sets - 1 reps - 1min hold  Supine Pelvic Floor Contraction - 2-3 x daily - 7 x weekly - 1 sets - 10 reps - 7 hold  Prone Quad Stretch with Towel Roll and Strap - 1 x daily - 7 x weekly - 1 sets - 1 reps - 1min hold  Prone Hip Internal Rotation with Resistance - 1 x daily - 7 x weekly - 1-2 sets - 10 reps  Clamshell with Resistance - 1 x daily - 7 x weekly - 1-2 sets - 10 reps  Sidelying Reverse Clamshell with Resistance - 1 x daily - 7 x weekly - 1-2 sets - 10 reps  Seated Figure 4 Piriformis Stretch - 1 x daily - 7 x weekly - 1 sets - 1 reps - 1 min hold  Seated Pelvic Floor Contraction - 1-2 x daily - 7 x weekly - 1 sets - 10 reps - 7 hold  Piriformis Mobilization with Small Ball - 1 x daily - 7 x weekly - 1 sets - 10 reps  Supine Lower Trunk Rotation with Swiss Ball - 1 x daily - 7 x weekly - 1-2 sets - 10 reps  Supine Knees to Chest with Swiss Ball - 1 x daily - 7 x weekly - 1-2 sets - 10 reps  Supine Bridge with Pelvic Floor Contraction on Swiss Ball - 1 x daily - 7 x weekly - 1-2 sets - 15 reps - 1-2 hold  Abdominal Press into Fay Deter - 1 x daily - 7 x weekly - 1 sets - 10 reps - 2 hold  Cat-Camel - 1 x daily - 7 x weekly - 1-2 sets - 10 reps  Quadruped Full Range Thoracic Rotation with Reach - 1 x daily - 7 x weekly - 1-2 sets - 10 reps  Tall Kneeling Hip Hinge - 1 x daily - 7 x weekly - 1-2 sets - 10 reps  Plank with Thoracic Rotation on Counter - 1 x daily - 7 x weekly - 1-2 sets - 10 reps  Quadruped Pelvic Floor Contraction with Opposite Arm and Leg Lift - 1 x daily - 7 x weekly - 1 sets - 10 reps  ITB Stretch at Wall - 1 x daily - 7 x weekly - 1 sets - 1 reps - 60 hold  Sit to Stand Without Arm Support - 1 x daily - 7 x weekly - 3 sets - 10 reps    Patient Education  Urinary Incontinence  Office Posture  Sleep Positions  Household Activities  Low Back Pain Handout  Treating Persistent Pain Without Medications: Relaxation  Heat  Ice  Forward Head Posture  Comprehension of Education:  [x] Verbalizes understanding. [x] Demonstrates understanding. [] Needs review. For tech & set up of new PREs  [x] Demonstrates/verbalizes HEP/Ed previously given.      Plan: [x] D/C today 5/26/22      Time In: 405p           Time Out:435p    Electronically signed by:  Majo Romo, PT

## 2022-12-28 ENCOUNTER — OFFICE VISIT (OUTPATIENT)
Dept: OBGYN CLINIC | Age: 67
End: 2022-12-28
Payer: COMMERCIAL

## 2022-12-28 VITALS
SYSTOLIC BLOOD PRESSURE: 129 MMHG | HEART RATE: 81 BPM | WEIGHT: 147.4 LBS | DIASTOLIC BLOOD PRESSURE: 89 MMHG | BODY MASS INDEX: 26.96 KG/M2

## 2022-12-28 DIAGNOSIS — Z01.419 WELL WOMAN EXAM WITH ROUTINE GYNECOLOGICAL EXAM: Primary | ICD-10-CM

## 2022-12-28 DIAGNOSIS — L03.314 CELLULITIS OF GROIN: ICD-10-CM

## 2022-12-28 DIAGNOSIS — Z12.31 SCREENING MAMMOGRAM FOR BREAST CANCER: ICD-10-CM

## 2022-12-28 PROCEDURE — 99397 PER PM REEVAL EST PAT 65+ YR: CPT | Performed by: OBSTETRICS & GYNECOLOGY

## 2022-12-28 RX ORDER — FLUCONAZOLE 150 MG/1
150 TABLET ORAL ONCE
Qty: 1 TABLET | Refills: 0 | Status: SHIPPED | OUTPATIENT
Start: 2022-12-28 | End: 2022-12-28

## 2022-12-28 RX ORDER — SULFAMETHOXAZOLE AND TRIMETHOPRIM 800; 160 MG/1; MG/1
1 TABLET ORAL 2 TIMES DAILY
Qty: 6 TABLET | Refills: 0 | Status: SHIPPED | OUTPATIENT
Start: 2022-12-28 | End: 2022-12-31

## 2022-12-28 ASSESSMENT — ENCOUNTER SYMPTOMS
VOMITING: 0
DIARRHEA: 0
NAUSEA: 0
COUGH: 0
WHEEZING: 0
CONSTIPATION: 0
ABDOMINAL PAIN: 0

## 2022-12-28 NOTE — PROGRESS NOTES
[facility], [address] [facility]  DATE OF VISIT:  22        History and Physical    Carolyn Frias    :  1955  CHIEF COMPLAINT:    Chief Complaint   Patient presents with    Annual Exam                    HPI :   Carolyn Frias is a 74 y.o. female    The patient was seen and examined. Per the patient bowels are regular. She has novoiding complaints. She denies any bloating as well as vaginal discharge. Denies vaginal dryness or hot flushes. She does have bothersome stress incontinence. She's seen physical therapy with good results and uses a pessary occasionally, but the discharge she gets from it bothers her. She may be ready to pursue surgical management. She has a bump that feels like a pimple on her labia for a few days. It's happened before and resolves with warm compresses.    _____________________________________________________________________  Past Medical History:   Diagnosis Date    Elevated cholesterol     Headache(784.0)     HTN (hypertension)                                                                    Past Surgical History:   Procedure Laterality Date     SECTION      COLONOSCOPY      with endoscopy    TONSILLECTOMY AND ADENOIDECTOMY      UPPER GASTROINTESTINAL ENDOSCOPY       Family History   Problem Relation Age of Onset    Heart Disease Father         chf    Diabetes Mother     Hypertension Mother     Heart Disease Mother         chf    Heart Disease Maternal Grandmother     Heart Attack Maternal Grandmother     Heart Disease Maternal Grandfather      Social History     Tobacco Use   Smoking Status Never   Smokeless Tobacco Never     Social History     Substance and Sexual Activity   Alcohol Use Yes    Comment: social     Current Outpatient Medications   Medication Sig Dispense Refill    fluconazole (DIFLUCAN) 150 MG tablet Take 1 tablet by mouth once for 1 dose 1 tablet 0    sulfamethoxazole-trimethoprim (BACTRIM DS;SEPTRA DS) 800-160 MG per tablet Take 1 tablet by mouth 2 times daily for 3 days 6 tablet 0    estradiol (ESTRACE VAGINAL) 0.1 MG/GM vaginal cream Place 1 g vaginally Twice a Week 42.5 g 3    risedronate (ACTONEL) 35 MG tablet Take 1 tablet by mouth every 7 days 4 tablet 11    oxyquinolone (TRIMO-MCGINNIS) 0.025 % GEL Place 1 applicator vaginally as needed (for pessary) 113.4 g 0    meloxicam (MOBIC) 15 MG tablet TAKE ONE TABLET BY MOUTH DAILY 90 tablet 3    losartan (COZAAR) 50 MG tablet losartan 50 mg tablet      hydroCHLOROthiazide (HYDRODIURIL) 12.5 MG tablet       simvastatin (ZOCOR) 20 MG tablet       losartan-hydroCHLOROthiazide (HYZAAR) 50-12.5 MG per tablet Take 1 tablet by mouth daily. fish oil-omega-3 fatty acids 1000 MG capsule Take 2 g by mouth daily      Ascorbic Acid (VITAMIN C) 500 MG tablet Take 500 mg by mouth daily      Glucosamine-Chondroitin 500-400 MG CAPS Take 2 each by mouth 2 times daily. Omeprazole Magnesium (PRILOSEC OTC PO) Take  by mouth. aspirin 81 MG chewable tablet Take 81 mg by mouth daily      therapeutic multivitamin-minerals (THERAGRAN-M) tablet Take 1 tablet by mouth daily. No current facility-administered medications for this visit. Allergies:  Patient has no known allergies. Gynecologic History:  No LMP recorded. Patient is postmenopausal.  Sexually Active: Yes, with her   STD History: No  Pap smear history: negative       OB History    Para Term  AB Living   6 4 3 0 0 3   SAB IAB Ectopic Molar Multiple Live Births   0 0 0 0 0 0     ______________________________________________________________________  REVIEW OF SYSTEMS:  Review of Systems   Constitutional:  Negative for chills and fever. HENT:  Negative for hearing loss. Respiratory:  Negative for cough and wheezing. Cardiovascular:  Negative for chest pain and palpitations. Gastrointestinal:  Negative for abdominal pain, constipation, diarrhea, nausea and vomiting. Genitourinary:  Negative for dysuria, frequency and urgency. Musculoskeletal:  Negative for myalgias. Skin:  Negative for rash. Neurological:  Negative for dizziness, weakness and headaches. Hematological:  Does not bruise/bleed easily. Psychiatric/Behavioral:  Negative for suicidal ideas. /89 (Site: Right Upper Arm, Position: Sitting, Cuff Size: Medium Adult)   Pulse 81   Wt 147 lb 6.4 oz (66.9 kg)   BMI 26.96 kg/m²                    Physical Exam:     Physical Exam  Constitutional:       Appearance: She is well-developed. HENT:      Head: Normocephalic. Neck:      Thyroid: No thyromegaly. Vascular: No JVD. Cardiovascular:      Rate and Rhythm: Normal rate and regular rhythm. Pulmonary:      Effort: Pulmonary effort is normal. No respiratory distress. Breath sounds: Normal breath sounds. No wheezing or rales. Chest:      Chest wall: No tenderness. Breasts:     Breasts are symmetrical.      Right: No inverted nipple, mass, nipple discharge, skin change or tenderness. Left: No inverted nipple, mass, nipple discharge, skin change or tenderness. Abdominal:      General: Bowel sounds are normal. There is no distension. Palpations: Abdomen is soft. Tenderness: There is no abdominal tenderness. Genitourinary:     Exam position: Supine. Labia:         Right: No rash, tenderness, lesion or injury. Left: No rash, tenderness, lesion or injury. Vagina: Normal. No foreign body. No erythema or bleeding. Cervix: No cervical motion tenderness, discharge or friability. Uterus: Not deviated, not enlarged, not fixed and not tender. Adnexa:         Right: No mass, tenderness or fullness. Left: No mass, tenderness or fullness. Musculoskeletal:         General: Normal range of motion. Lymphadenopathy:      Cervical: No cervical adenopathy. Skin:     General: Skin is warm and dry.    Neurological:      Mental Status: She is alert and oriented to person, place, and time. Deep Tendon Reflexes: Reflexes are normal and symmetric. Psychiatric:         Behavior: Behavior normal.         Thought Content: Thought content normal.         Judgment: Judgment normal.           ASSESSMENT:        74 y.o. Female; Annual   Diagnosis Orders   1. Well woman exam with routine gynecological exam        2. Screening mammogram for breast cancer  [facility] MANAV DIGITAL SCREEN BILATERAL      3. Cellulitis of groin          Return in about 1 year (around 12/28/2023) for annual exam.              Hereditary Breast, Ovarian,Colon and Uterine Cancer screening Done. Tobacco & Secondary smoke risks reviewed; instructed oncessation and avoidance    PLAN:  ->age 74 with negative prior screening-can discontinue pap smears.   -Will make appt with Dr. Mookie Tariq to discuss possible anterior repair/sling. 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  -Suspected labial cellulitis. Declines syphilis testing. Will treat with Bactrim (pt requested Diflucan as well). Pt will call if it does not improve or if she develops fevers. Suspect folliculitis. -Menopause symptoms discussed   - Screening mammogram discussed and advised yearly if normal starting at age 38. Order given. - Calcium and Vitamin D dosing reviewed. Pt on On Risedronate for osteopenia. Plan for repeat DEXA next year. - Colonoscopy screening reviewed. -General diet and exercise reviewed. - Routine health maintenance per patients PCP.     Electronically signed by Kenia Mccracken MD on 12/28/2022at 11:32 AM  [address] [place]

## 2022-12-29 ENCOUNTER — TELEPHONE (OUTPATIENT)
Dept: OBGYN CLINIC | Age: 67
End: 2022-12-29

## 2023-05-01 ENCOUNTER — HOSPITAL ENCOUNTER (OUTPATIENT)
Dept: MAMMOGRAPHY | Age: 68
Discharge: HOME | End: 2023-05-03
Payer: COMMERCIAL

## 2023-05-01 DIAGNOSIS — Z12.31 SCREENING MAMMOGRAM FOR BREAST CANCER: ICD-10-CM

## 2023-05-01 PROCEDURE — 77067 SCR MAMMO BI INCL CAD: CPT

## 2024-04-16 ENCOUNTER — OFFICE VISIT (OUTPATIENT)
Dept: OBGYN CLINIC | Age: 69
End: 2024-04-16
Payer: COMMERCIAL

## 2024-04-16 VITALS
DIASTOLIC BLOOD PRESSURE: 80 MMHG | SYSTOLIC BLOOD PRESSURE: 120 MMHG | HEART RATE: 78 BPM | HEIGHT: 62 IN | WEIGHT: 148 LBS | BODY MASS INDEX: 27.23 KG/M2

## 2024-04-16 DIAGNOSIS — Z12.31 ENCOUNTER FOR SCREENING MAMMOGRAM FOR BREAST CANCER: ICD-10-CM

## 2024-04-16 DIAGNOSIS — Z01.419 ENCOUNTER FOR GYNECOLOGICAL EXAMINATION: Primary | ICD-10-CM

## 2024-04-16 PROCEDURE — 99397 PER PM REEVAL EST PAT 65+ YR: CPT | Performed by: OBSTETRICS & GYNECOLOGY

## 2024-04-16 RX ORDER — PAROXETINE 10 MG/1
10 TABLET, FILM COATED ORAL DAILY
Qty: 30 TABLET | Refills: 5 | Status: SHIPPED | OUTPATIENT
Start: 2024-04-16

## 2024-04-16 ASSESSMENT — ENCOUNTER SYMPTOMS
COUGH: 0
ABDOMINAL PAIN: 0
SHORTNESS OF BREATH: 0
BACK PAIN: 0

## 2024-04-16 NOTE — PROGRESS NOTES
[facility]  [address]  [address]  [facility] [address]  Dept: [phone]    Chief complaint:   Chief Complaint   Patient presents with    Annual Exam     Pap 12/28/21 wnl/hpv neg  Marc 5/2/23 br1    Other     Intense night sweats       History Present Illness: Janet is a 70 yo female who presents for her annual exam.  She has started having intense night sweats that just started recently.  She says it has been going on for about a month.  She denies any changes in medications, exercise, caffeine or alcohol.  She says she has to almost wring out her nightgown.  She says she had them before and they got much better, but now they are back.  She is wanting to try something to see if it will help.  She has a pessary that she uses occasionally when she needs it.  She is able to take it out herself and clean it as needed.  She is sexually active with her  and denies any dyspareunia.  She denies any vaginal discharge or irritation.  She denies any bowel or bladder issues.     Current Medications (OTC/Herbal):   Current Outpatient Medications   Medication Sig Dispense Refill    Calcium Citrate (CITRACAL PO) Take by mouth      estradiol (ESTRACE VAGINAL) 0.1 MG/GM vaginal cream Place 1 g vaginally Twice a Week 42.5 g 3    risedronate (ACTONEL) 35 MG tablet Take 1 tablet by mouth every 7 days 4 tablet 11    oxyquinolone (TRIMO-MCGINNIS) 0.025 % GEL Place 1 applicator vaginally as needed (for pessary) 113.4 g 0    meloxicam (MOBIC) 15 MG tablet TAKE ONE TABLET BY MOUTH DAILY 90 tablet 3    losartan (COZAAR) 50 MG tablet losartan 50 mg tablet      hydroCHLOROthiazide (HYDRODIURIL) 12.5 MG tablet       simvastatin (ZOCOR) 20 MG tablet       losartan-hydroCHLOROthiazide (HYZAAR) 50-12.5 MG per tablet Take 1 tablet by mouth daily.      fish oil-omega-3 fatty acids 1000 MG capsule Take 2 g by mouth daily      Ascorbic Acid (VITAMIN C) 500 MG

## 2024-05-03 ENCOUNTER — HOSPITAL ENCOUNTER (OUTPATIENT)
Dept: MAMMOGRAPHY | Age: 69
Discharge: HOME | End: 2024-05-03
Attending: OBSTETRICS & GYNECOLOGY
Payer: COMMERCIAL

## 2024-05-03 VITALS — BODY MASS INDEX: 27.23 KG/M2 | HEIGHT: 62 IN | WEIGHT: 148 LBS

## 2024-05-03 DIAGNOSIS — Z12.31 ENCOUNTER FOR SCREENING MAMMOGRAM FOR BREAST CANCER: ICD-10-CM

## 2024-05-03 PROCEDURE — 77063 BREAST TOMOSYNTHESIS BI: CPT

## 2024-10-13 SDOH — ECONOMIC STABILITY: FOOD INSECURITY: WITHIN THE PAST 12 MONTHS, THE FOOD YOU BOUGHT JUST DIDN'T LAST AND YOU DIDN'T HAVE MONEY TO GET MORE.: NEVER TRUE

## 2024-10-13 SDOH — ECONOMIC STABILITY: TRANSPORTATION INSECURITY
IN THE PAST 12 MONTHS, HAS LACK OF TRANSPORTATION KEPT YOU FROM MEETINGS, WORK, OR FROM GETTING THINGS NEEDED FOR DAILY LIVING?: NO

## 2024-10-13 SDOH — ECONOMIC STABILITY: FOOD INSECURITY: WITHIN THE PAST 12 MONTHS, YOU WORRIED THAT YOUR FOOD WOULD RUN OUT BEFORE YOU GOT MONEY TO BUY MORE.: NEVER TRUE

## 2024-10-13 SDOH — ECONOMIC STABILITY: INCOME INSECURITY: HOW HARD IS IT FOR YOU TO PAY FOR THE VERY BASICS LIKE FOOD, HOUSING, MEDICAL CARE, AND HEATING?: NOT HARD AT ALL

## 2024-10-13 ASSESSMENT — PATIENT HEALTH QUESTIONNAIRE - PHQ9
1. LITTLE INTEREST OR PLEASURE IN DOING THINGS: NOT AT ALL
1. LITTLE INTEREST OR PLEASURE IN DOING THINGS: NOT AT ALL
SUM OF ALL RESPONSES TO PHQ QUESTIONS 1-9: 0
2. FEELING DOWN, DEPRESSED OR HOPELESS: NOT AT ALL
SUM OF ALL RESPONSES TO PHQ9 QUESTIONS 1 & 2: 0
SUM OF ALL RESPONSES TO PHQ QUESTIONS 1-9: 0
SUM OF ALL RESPONSES TO PHQ9 QUESTIONS 1 & 2: 0
2. FEELING DOWN, DEPRESSED OR HOPELESS: NOT AT ALL

## 2024-10-16 ENCOUNTER — OFFICE VISIT (OUTPATIENT)
Dept: OBGYN CLINIC | Age: 69
End: 2024-10-16
Payer: COMMERCIAL

## 2024-10-16 VITALS
DIASTOLIC BLOOD PRESSURE: 89 MMHG | HEART RATE: 73 BPM | WEIGHT: 149 LBS | HEIGHT: 62 IN | BODY MASS INDEX: 27.42 KG/M2 | SYSTOLIC BLOOD PRESSURE: 147 MMHG

## 2024-10-16 DIAGNOSIS — N95.1 VASOMOTOR SYMPTOMS DUE TO MENOPAUSE: Primary | ICD-10-CM

## 2024-10-16 PROCEDURE — 99212 OFFICE O/P EST SF 10 MIN: CPT | Performed by: OBSTETRICS & GYNECOLOGY

## 2024-10-16 PROCEDURE — 1123F ACP DISCUSS/DSCN MKR DOCD: CPT | Performed by: OBSTETRICS & GYNECOLOGY

## 2024-10-16 RX ORDER — PAROXETINE 10 MG/1
10 TABLET, FILM COATED ORAL DAILY
Qty: 30 TABLET | Refills: 11 | Status: SHIPPED | OUTPATIENT
Start: 2024-10-16

## 2024-10-16 ASSESSMENT — ENCOUNTER SYMPTOMS
SHORTNESS OF BREATH: 0
BACK PAIN: 0
COUGH: 0
ABDOMINAL PAIN: 0

## 2024-10-16 NOTE — PROGRESS NOTES
[facility]  [address]  [address]  [facility] [address]  Dept: [phone]  Dept Fax: [phone]    10/16/24    Chief Complaint   Patient presents with    Medication Check       Janet Lopez 69 y.o. is here for medication follow up for her vasomotor menopausal symptoms.  She is doing well with the Paxil.  She says that she isn't having to change her nightgown because of sweating anymore.  She says that it is helping with her mood and she feels more even and she likes it.  She says her anxiety is better controlled. She likes how she feels and wants to stay on it.      Review of Systems   Constitutional:  Negative for chills and fever.   HENT:  Negative for congestion.    Respiratory:  Negative for cough and shortness of breath.    Cardiovascular:  Negative for chest pain and palpitations.   Gastrointestinal:  Negative for abdominal pain.   Genitourinary:  Negative for pelvic pain and vaginal discharge.   Musculoskeletal:  Negative for back pain.   Neurological:  Negative for dizziness and light-headedness.   Psychiatric/Behavioral:  The patient is not nervous/anxious.        Gynecologic History  No LMP recorded. Patient is postmenopausal.  Contraception: post menopausal status  Last Pap: 21  Results: normal  Last Mammogram: 5/3/24  Results: normal    Obstetric History  : 3  Para: 3  AB: 0    Past Medical History:   Diagnosis Date    Elevated cholesterol     Headache(784.0)     HTN (hypertension)      Past Surgical History:   Procedure Laterality Date     SECTION      COLONOSCOPY      with endoscopy    TONSILLECTOMY AND ADENOIDECTOMY      UPPER GASTROINTESTINAL ENDOSCOPY       No Known Allergies  Current Outpatient Medications   Medication Sig Dispense Refill    PARoxetine (PAXIL) 10 MG tablet Take 1 tablet by mouth daily 30 tablet 11    Calcium Citrate (CITRACAL PO) Take by mouth      estradiol (ESTRACE

## 2025-04-15 SDOH — ECONOMIC STABILITY: FOOD INSECURITY: WITHIN THE PAST 12 MONTHS, YOU WORRIED THAT YOUR FOOD WOULD RUN OUT BEFORE YOU GOT MONEY TO BUY MORE.: NEVER TRUE

## 2025-04-15 SDOH — ECONOMIC STABILITY: INCOME INSECURITY: IN THE LAST 12 MONTHS, WAS THERE A TIME WHEN YOU WERE NOT ABLE TO PAY THE MORTGAGE OR RENT ON TIME?: NO

## 2025-04-15 SDOH — ECONOMIC STABILITY: FOOD INSECURITY: WITHIN THE PAST 12 MONTHS, THE FOOD YOU BOUGHT JUST DIDN'T LAST AND YOU DIDN'T HAVE MONEY TO GET MORE.: NEVER TRUE

## 2025-04-15 SDOH — ECONOMIC STABILITY: TRANSPORTATION INSECURITY
IN THE PAST 12 MONTHS, HAS THE LACK OF TRANSPORTATION KEPT YOU FROM MEDICAL APPOINTMENTS OR FROM GETTING MEDICATIONS?: NO

## 2025-04-15 ASSESSMENT — PATIENT HEALTH QUESTIONNAIRE - PHQ9
SUM OF ALL RESPONSES TO PHQ9 QUESTIONS 1 & 2: 0
2. FEELING DOWN, DEPRESSED OR HOPELESS: NOT AT ALL
1. LITTLE INTEREST OR PLEASURE IN DOING THINGS: NOT AT ALL
SUM OF ALL RESPONSES TO PHQ QUESTIONS 1-9: 0
1. LITTLE INTEREST OR PLEASURE IN DOING THINGS: NOT AT ALL
SUM OF ALL RESPONSES TO PHQ QUESTIONS 1-9: 0
2. FEELING DOWN, DEPRESSED OR HOPELESS: NOT AT ALL

## 2025-04-16 ENCOUNTER — OFFICE VISIT (OUTPATIENT)
Dept: OBGYN CLINIC | Age: 70
End: 2025-04-16
Payer: COMMERCIAL

## 2025-04-16 VITALS
HEART RATE: 79 BPM | BODY MASS INDEX: 28.17 KG/M2 | WEIGHT: 154 LBS | SYSTOLIC BLOOD PRESSURE: 125 MMHG | DIASTOLIC BLOOD PRESSURE: 84 MMHG

## 2025-04-16 DIAGNOSIS — Z01.419 ENCOUNTER FOR GYNECOLOGICAL EXAMINATION: Primary | ICD-10-CM

## 2025-04-16 DIAGNOSIS — Z12.31 ENCOUNTER FOR SCREENING MAMMOGRAM FOR BREAST CANCER: ICD-10-CM

## 2025-04-16 PROCEDURE — 99397 PER PM REEVAL EST PAT 65+ YR: CPT | Performed by: OBSTETRICS & GYNECOLOGY

## 2025-04-16 PROCEDURE — 99459 PELVIC EXAMINATION: CPT | Performed by: OBSTETRICS & GYNECOLOGY

## 2025-04-16 RX ORDER — DESVENLAFAXINE 25 MG/1
TABLET, EXTENDED RELEASE ORAL
COMMUNITY
Start: 2025-04-09

## 2025-04-16 ASSESSMENT — ENCOUNTER SYMPTOMS
COUGH: 0
SHORTNESS OF BREATH: 0
ABDOMINAL PAIN: 0

## 2025-04-16 NOTE — PROGRESS NOTES
Health     Financial Resource Strain: Not on file   Food Insecurity: Not on file (4/15/2025)   Transportation Needs: Not on file   Physical Activity: Not on file   Stress: Not on file   Social Connections: Not on file   Intimate Partner Violence: Not on file   Housing Stability: Unknown (4/15/2025)    Housing Stability Vital Sign     Unable to Pay for Housing in the Last Year: Not on file     Number of Times Moved in the Last Year: 0     Homeless in the Last Year: No       Family History   Problem Relation Age of Onset    Heart Disease Father         chf    Diabetes Mother     Hypertension Mother     Heart Disease Mother         chf    Heart Disease Maternal Grandmother     Heart Attack Maternal Grandmother     Heart Disease Maternal Grandfather        Review of Systems:   Review of Systems   Constitutional:  Negative for chills and fever.   HENT:  Negative for congestion.    Respiratory:  Negative for cough and shortness of breath.    Cardiovascular:  Negative for chest pain and palpitations.   Gastrointestinal:  Negative for abdominal pain.   Genitourinary:  Negative for dyspareunia, pelvic pain and vaginal discharge.   Musculoskeletal:  Positive for arthralgias.   Neurological:  Negative for dizziness and light-headedness.   Psychiatric/Behavioral:  The patient is not nervous/anxious.        Physical exam:  vitals:  Height   5  ft    2 in,  Weight    154 lbs,   125/84 BP  Gen: alert, no apparent distress  HEENT:No pathologic skin lesions noted,NC/AT,PERRL, normal midline nontender thyroid   Lung Exam: Clear to auscultation in all fields bilaterally, without wheezes,rales or rhonchi.  Cardiac Exam: Normal sinus rhythm andrate, without murmurs, rubs or gallops appreciated.  Breast Exam: Symmetric without pathological skin changes, nontender without discrete suspicious masses palpated, supraclavicular or axillary adenopathy or nipple discharge noted.  Abdominal Exam: Nontender to deep palpation without organomegaly,

## 2025-06-03 ENCOUNTER — RESULTS FOLLOW-UP (OUTPATIENT)
Dept: OBGYN CLINIC | Age: 70
End: 2025-06-03

## 2025-06-03 DIAGNOSIS — Z12.31 ENCOUNTER FOR SCREENING MAMMOGRAM FOR BREAST CANCER: ICD-10-CM
